# Patient Record
Sex: MALE | Race: WHITE | Employment: OTHER | URBAN - METROPOLITAN AREA
[De-identification: names, ages, dates, MRNs, and addresses within clinical notes are randomized per-mention and may not be internally consistent; named-entity substitution may affect disease eponyms.]

---

## 2017-01-04 ENCOUNTER — HOSPITAL ENCOUNTER (OUTPATIENT)
Dept: LAB | Age: 67
Discharge: HOME | End: 2017-01-04
Payer: MEDICARE

## 2017-01-04 PROCEDURE — 82270 OCCULT BLOOD FECES: CPT

## 2017-01-06 LAB
HEMOCCULT STL QL IA: NEGATIVE
TEST CODE CHANGE, 977287: NORMAL

## 2017-02-25 ENCOUNTER — HOSPITAL ENCOUNTER (EMERGENCY)
Age: 67
Discharge: HOME | End: 2017-02-25
Attending: EMERGENCY MEDICINE
Payer: MEDICARE

## 2017-02-25 VITALS
SYSTOLIC BLOOD PRESSURE: 132 MMHG | DIASTOLIC BLOOD PRESSURE: 68 MMHG | OXYGEN SATURATION: 98 % | WEIGHT: 225 LBS | HEIGHT: 68 IN | TEMPERATURE: 97.8 F | HEART RATE: 68 BPM | RESPIRATION RATE: 16 BRPM | BODY MASS INDEX: 34.1 KG/M2

## 2017-02-25 DIAGNOSIS — S39.012A LUMBAR STRAIN, INITIAL ENCOUNTER: Primary | ICD-10-CM

## 2017-02-25 LAB
APPEARANCE UR: CLEAR
BACTERIA URNS QL MICRO: NEGATIVE /HPF
BILIRUB UR QL: NEGATIVE
COLOR UR: NORMAL
EPITH CASTS URNS QL MICRO: NORMAL /LPF
GLUCOSE UR STRIP.AUTO-MCNC: NEGATIVE MG/DL
HGB UR QL STRIP: NEGATIVE
HYALINE CASTS URNS QL MICRO: NORMAL /LPF (ref 0–5)
KETONES UR QL STRIP.AUTO: NEGATIVE MG/DL
LEUKOCYTE ESTERASE UR QL STRIP.AUTO: NEGATIVE
NITRITE UR QL STRIP.AUTO: NEGATIVE
PH UR STRIP: 6.5 [PH] (ref 5–8)
PROT UR STRIP-MCNC: NEGATIVE MG/DL
RBC #/AREA URNS HPF: NORMAL /HPF (ref 0–5)
SP GR UR REFRACTOMETRY: 1.02 (ref 1–1.03)
UA: UC IF INDICATED,UAUC: NORMAL
UROBILINOGEN UR QL STRIP.AUTO: 0.2 EU/DL (ref 0.2–1)
WBC URNS QL MICRO: NORMAL /HPF (ref 0–4)

## 2017-02-25 PROCEDURE — 81001 URINALYSIS AUTO W/SCOPE: CPT | Performed by: EMERGENCY MEDICINE

## 2017-02-25 PROCEDURE — 74011250636 HC RX REV CODE- 250/636: Performed by: EMERGENCY MEDICINE

## 2017-02-25 PROCEDURE — 96375 TX/PRO/DX INJ NEW DRUG ADDON: CPT

## 2017-02-25 PROCEDURE — 99284 EMERGENCY DEPT VISIT MOD MDM: CPT

## 2017-02-25 PROCEDURE — 96374 THER/PROPH/DIAG INJ IV PUSH: CPT

## 2017-02-25 PROCEDURE — 96361 HYDRATE IV INFUSION ADD-ON: CPT

## 2017-02-25 RX ORDER — FENTANYL CITRATE 50 UG/ML
50 INJECTION, SOLUTION INTRAMUSCULAR; INTRAVENOUS
Status: COMPLETED | OUTPATIENT
Start: 2017-02-25 | End: 2017-02-25

## 2017-02-25 RX ORDER — DIAZEPAM 10 MG/2ML
2 INJECTION INTRAMUSCULAR
Status: COMPLETED | OUTPATIENT
Start: 2017-02-25 | End: 2017-02-25

## 2017-02-25 RX ORDER — METHOCARBAMOL 750 MG/1
750 TABLET, FILM COATED ORAL EVERY 6 HOURS
Qty: 20 TAB | Refills: 0 | Status: SHIPPED | OUTPATIENT
Start: 2017-02-25 | End: 2017-03-02

## 2017-02-25 RX ORDER — METHOCARBAMOL 750 MG/1
750 TABLET, FILM COATED ORAL EVERY 6 HOURS
Qty: 20 TAB | Refills: 0 | Status: SHIPPED | OUTPATIENT
Start: 2017-02-25 | End: 2017-02-25

## 2017-02-25 RX ORDER — TRAMADOL HYDROCHLORIDE 50 MG/1
50 TABLET ORAL
Qty: 20 TAB | Refills: 0 | Status: SHIPPED | OUTPATIENT
Start: 2017-02-25 | End: 2017-05-26 | Stop reason: ALTCHOICE

## 2017-02-25 RX ORDER — PREDNISONE 10 MG/1
TABLET ORAL
Qty: 21 TAB | Refills: 0 | Status: SHIPPED | OUTPATIENT
Start: 2017-02-25 | End: 2017-05-26 | Stop reason: ALTCHOICE

## 2017-02-25 RX ORDER — KETOROLAC TROMETHAMINE 30 MG/ML
30 INJECTION, SOLUTION INTRAMUSCULAR; INTRAVENOUS
Status: COMPLETED | OUTPATIENT
Start: 2017-02-25 | End: 2017-02-25

## 2017-02-25 RX ADMIN — SODIUM CHLORIDE 500 ML: 900 INJECTION, SOLUTION INTRAVENOUS at 09:41

## 2017-02-25 RX ADMIN — METHYLPREDNISOLONE SODIUM SUCCINATE 125 MG: 125 INJECTION, POWDER, FOR SOLUTION INTRAMUSCULAR; INTRAVENOUS at 09:54

## 2017-02-25 RX ADMIN — DIAZEPAM 2 MG: 5 INJECTION, SOLUTION INTRAMUSCULAR; INTRAVENOUS at 09:53

## 2017-02-25 RX ADMIN — SODIUM CHLORIDE 1000 ML: 900 INJECTION, SOLUTION INTRAVENOUS at 10:55

## 2017-02-25 RX ADMIN — KETOROLAC TROMETHAMINE 30 MG: 30 INJECTION, SOLUTION INTRAMUSCULAR at 09:53

## 2017-02-25 RX ADMIN — FENTANYL CITRATE 50 MCG: 50 INJECTION, SOLUTION INTRAMUSCULAR; INTRAVENOUS at 10:47

## 2017-02-25 NOTE — Clinical Note
Take Ibuprofen 4-200mg tablets every 6 hours with food. Use stool softener with laxative when taking narcotic medication

## 2017-02-25 NOTE — DISCHARGE INSTRUCTIONS
We hope that we have addressed all of your medical concerns. The examination and treatment you received in the Emergency Department were for an emergent problem and were not intended as complete care. It is important that you follow up with your healthcare provider(s) for ongoing care. If your symptoms worsen or do not improve as expected, and you are unable to reach your usual health care provider(s), you should return to the Emergency Department. Today's healthcare is undergoing tremendous change, and patient satisfaction surveys are one of the many tools to assess the quality of medical care. You may receive a survey from the [employer] regarding your experience in the Emergency Department. I hope that your experience has been completely positive, particularly the medical care that I provided. As such, please participate in the survey; anything less than excellent does not meet my expectations or intentions. [facility] and [address] [place] [address] participate in nationally recognized quality of care measures. If your blood pressure is greater than 120/80, as reported below, we urge that you seek medical care to address the potential of high blood pressure, commonly known as hypertension. Hypertension can be hereditary or can be caused by certain medical conditions, pain, stress, or \"white coat syndrome. \"       Please make an appointment with your health care provider(s) for follow up of your Emergency Department visit. VITALS:   Patient Vitals for the past 8 hrs:   Temp Pulse Resp BP SpO2   02/25/17 0937 - 78 18 (!) 142/98 98 %   02/25/17 0847 97.6 °F (36.4 °C) 72 20 (!) 141/91 96 %          Thank you for allowing us to provide you with medical care today. We realize that you have many choices for your emergency care needs. Please choose us in the future for any continued health care needs.       Donnie Sinha M.D. [address], [address] [facility]: [phone]            No results found for this or any previous visit (from the past 24 hour(s)). No results found. Back Pain, Emergency or Urgent Symptoms: Care Instructions  Your Care Instructions  Many people have back pain at one time or another. In most cases, pain gets better with self-care that includes over-the-counter pain medicine, ice, heat, and exercises. Unless you have symptoms of a severe injury or heart attack, you may be able to give yourself a few days before you call a doctor. But some back problems are very serious. Do not ignore symptoms that need to be checked right away. Follow-up care is a key part of your treatment and safety. Be sure to make and go to all appointments, and call your doctor if you are having problems. It's also a good idea to know your test results and keep a list of the medicines you take. How can you care for yourself at home? · Sit or lie in positions that are most comfortable and that reduce your pain. Try one of these positions when you lie down:  ¨ Lie on your back with your knees bent and supported by large pillows. ¨ Lie on the floor with your legs on the seat of a sofa or chair. Virginie Settle on your side with your knees and hips bent and a pillow between your legs. ¨ Lie on your stomach if it does not make pain worse. · Do not sit up in bed, and avoid soft couches and twisted positions. Bed rest can help relieve pain at first, but it delays healing. Avoid bed rest after the first day. · Change positions every 30 minutes. If you must sit for long periods of time, take breaks from sitting. Get up and walk around, or lie flat. · Try using a heating pad on a low or medium setting, for 15 to 20 minutes every 2 or 3 hours. Try a warm shower in place of one session with the heating pad. You can also buy single-use heat wraps that last up to 8 hours.  You can also try ice or cold packs on your back for 10 to 20 minutes at a time, several times a day. (Put a thin cloth between the ice pack and your skin.) This reduces pain and makes it easier to be active and exercise. · Take pain medicines exactly as directed. ¨ If the doctor gave you a prescription medicine for pain, take it as prescribed. ¨ If you are not taking a prescription pain medicine, ask your doctor if you can take an over-the-counter medicine. When should you call for help? Call 911 anytime you think you may need emergency care. For example, call if:  · You are unable to move a leg at all. · You have back pain with severe belly pain. · You have symptoms of a heart attack. These may include:  ¨ Chest pain or pressure, or a strange feeling in the chest.  ¨ Sweating. ¨ Shortness of breath. ¨ Nausea or vomiting. ¨ Pain, pressure, or a strange feeling in the back, neck, jaw, or upper belly or in one or both shoulders or arms. ¨ Lightheadedness or sudden weakness. ¨ A fast or irregular heartbeat. After you call 911, the  may tell you to chew 1 adult-strength or 2 to 4 low-dose aspirin. Wait for an ambulance. Do not try to drive yourself. Call your doctor now or seek immediate medical care if:  · You have new or worse symptoms in your arms, legs, chest, belly, or buttocks. Symptoms may include:  ¨ Numbness or tingling. ¨ Weakness. ¨ Pain. · You lose bladder or bowel control. · You have back pain and:  ¨ You have injured your back while lifting or doing some other activity. Call if the pain is severe, has not gone away after 1 or 2 days, and you cannot do your normal daily activities. ¨ You have had a back injury before that needed treatment. ¨ Your pain has lasted longer than 4 weeks. ¨ You have had weight loss you cannot explain. ¨ You are age 48 or older. ¨ You have cancer now or have had it before.   Watch closely for changes in your health, and be sure to contact your doctor if you are not getting better as expected. Where can you learn more? Go to http://fransisco-freddy.info/. Enter Y160 in the search box to learn more about \"Back Pain, Emergency or Urgent Symptoms: Care Instructions. \"  Current as of: May 27, 2016  Content Version: 11.1  © 9803-6376 [employer]. Care instructions adapted under license by [employer] (which disclaims liability or warranty for this information). If you have questions about a medical condition or this instruction, always ask your healthcare professional. [address] any warranty or liability for your use of this information. Back Strain: Care Instructions  Your Care Instructions    Back strain happens when you overstretch, or pull, a muscle in your back. You may hurt your back in an accident or when you exercise or lift something. Most back pain will get better with rest and time. You can take care of yourself at home to help your back heal.  Follow-up care is a key part of your treatment and safety. Be sure to make and go to all appointments, and call your doctor if you are having problems. It's also a good idea to know your test results and keep a list of the medicines you take. How can you care for yourself at home? · Try to stay as active as you can, but stop or reduce any activity that causes pain. · Put ice or a cold pack on the sore muscle for 10 to 20 minutes at a time to stop swelling. Try this every 1 to 2 hours for 3 days (when you are awake) or until the swelling goes down. Put a thin cloth between the ice pack and your skin. · After 2 or 3 days, apply a heating pad on low or a warm cloth to your back. Some doctors suggest that you go back and forth between hot and cold treatments. · Take pain medicines exactly as directed. ¨ If the doctor gave you a prescription medicine for pain, take it as prescribed.   ¨ If you are not taking a prescription pain medicine, ask your doctor if you can take an over-the-counter medicine. · Try sleeping on your side with a pillow between your legs. Or put a pillow under your knees when you lie on your back. These measures can ease pain in your lower back. · Return to your usual level of activity slowly. When should you call for help? Call 911 anytime you think you may need emergency care. For example, call if:  · You are unable to move a leg at all. Call your doctor now or seek immediate medical care if:  · You have new or worse symptoms in your legs, belly, or buttocks. Symptoms may include:  ¨ Numbness or tingling. ¨ Weakness. ¨ Pain. · You lose bladder or bowel control. Watch closely for changes in your health, and be sure to contact your doctor if you are not getting better as expected. Where can you learn more? Go to http://fransisco-freddy.info/. Enter R784 in the search box to learn more about \"Back Strain: Care Instructions. \"  Current as of: May 23, 2016  Content Version: 11.1  © 5968-3495 [employer]. Care instructions adapted under license by [employer] (which disclaims liability or warranty for this information). If you have questions about a medical condition or this instruction, always ask your healthcare professional. [address] any warranty or liability for your use of this information.

## 2017-02-25 NOTE — ED TRIAGE NOTES
Triage Note: Patient is coming in via EMS with complaints of back pain that started this morning. Denies injury.

## 2017-02-25 NOTE — ED PROVIDER NOTES
HPI Comments: 73 y.o. male with past medical history significant for basal cell carcinoma and hyperlipidemia who presents accompanied by his wife with chief complaint of back pain. The pt c/o L \"lumbar pain\" that has been ongoing for about a week and significantly worsened this morning. The pt explains that he first developed back pain when he tried to  trash off the ground a week ago. The pt says that he has been bedridden for the past 5 days and was able to ambulate well yesterday. The pt reports that he reached over to turn on the faucet this morning and developed sharp, left-sided low back pain. The pt says is pain is \"15 out of 10.\" The pt notes pain with deep breaths. The pt says that he does not like taking NSAIDs since Advil causes circulatory problems and ASA induces constipation. Denies numbness/tingling, weakness, radicular pain down his legs, and bowel and bladder incontinence. There are no other acute medical concerns at this time. Social hx: Former smoker. Marijuana use. PCP: Erika Figueredo DO    Note written by Leona Shoemaker, as dictated by Joaquín Strong MD 9:06 AM      The history is provided by the patient. No  was used. Past Medical History:   Diagnosis Date    Basal cell carcinoma     Hyperlipidemia LDL goal < 130 3/6/2015       Past Surgical History:   Procedure Laterality Date    HX HEENT      basal cell carcinoma         Family History:   Problem Relation Age of Onset    No Known Problems Mother     No Known Problems Father        Social History     Social History    Marital status:      Spouse name: N/A    Number of children: N/A    Years of education: N/A     Occupational History    Not on file.      Social History Main Topics    Smoking status: Former Smoker     Packs/day: 0.25    Smokeless tobacco: Never Used    Alcohol use No    Drug use: Yes     Special: Marijuana    Sexual activity: Yes     Partners: Female     Birth control/ protection: None     Other Topics Concern    Not on file     Social History Narrative         ALLERGIES: Lactose; Lumigan [bimatoprost]; Other medication; Sunscreen; and Xalatan [latanoprost]    Review of Systems   Constitutional: Negative for chills, diaphoresis and fever. HENT: Negative for congestion, postnasal drip, rhinorrhea and sore throat. Eyes: Negative for photophobia, discharge, redness and visual disturbance. Respiratory: Negative for cough, chest tightness, shortness of breath and wheezing. Cardiovascular: Negative for chest pain, palpitations and leg swelling. Gastrointestinal: Negative for abdominal distention, abdominal pain, blood in stool, constipation, diarrhea, nausea and vomiting. Genitourinary: Negative for difficulty urinating, dysuria, frequency, hematuria and urgency. Musculoskeletal: Positive for back pain (lower left). Negative for arthralgias, joint swelling and myalgias. Skin: Negative for color change and rash. Neurological: Negative for dizziness, speech difficulty, weakness, light-headedness, numbness and headaches. Psychiatric/Behavioral: Negative for confusion. The patient is not nervous/anxious. All other systems reviewed and are negative. Vitals:    02/25/17 0847   BP: (!) 141/91   Pulse: 72   Resp: 20   Temp: 97.6 °F (36.4 °C)   SpO2: 96%   Weight: 102.1 kg (225 lb)   Height: 5' 8\" (1.727 m)            Physical Exam   Constitutional: He is oriented to person, place, and time. He appears well-developed and well-nourished. No distress. HENT:   Head: Normocephalic and atraumatic. Right Ear: External ear normal.   Left Ear: External ear normal.   Nose: Nose normal.   Mouth/Throat: Oropharynx is clear and moist.   Eyes: Conjunctivae and EOM are normal. Pupils are equal, round, and reactive to light. No scleral icterus. Neck: Normal range of motion. Neck supple. No JVD present. No tracheal deviation present. No thyromegaly present. Cardiovascular: Normal rate, regular rhythm and normal heart sounds. Exam reveals no gallop and no friction rub. No murmur heard. Pulmonary/Chest: Effort normal and breath sounds normal. No respiratory distress. He has no wheezes. He has no rales. He exhibits no tenderness. Abdominal: Soft. Bowel sounds are normal. He exhibits no distension and no mass. There is no tenderness. There is no rebound and no guarding. Musculoskeletal: Normal range of motion. He exhibits no edema. Lumbar back: He exhibits tenderness (left paraspinal). He exhibits no bony tenderness (at the midline). Lymphadenopathy:     He has no cervical adenopathy. Neurological: He is alert and oriented to person, place, and time. He has normal strength. He displays no atrophy and no tremor. No cranial nerve deficit. He exhibits normal muscle tone. Coordination and gait normal.   NVID. Negative straight leg test bilaterally. Skin: Skin is warm and dry. No rash noted. He is not diaphoretic. No erythema. Psychiatric: He has a normal mood and affect. His behavior is normal. Judgment and thought content normal.   Nursing note and vitals reviewed. Note written by Leona Vang, as dictated by Reinier Rincon MD 9:06 AM    MDM  Number of Diagnoses or Management Options  Diagnosis management comments: Patient presented to the emergency department with acute onset of left paralumbar pain occurred 5 days prior when picking up trash from the ground. No distal numbness weakness or paresthesias, no difficulty voiding. The patient has not used any analgesic or anti-inflammatory medications at home. His examination is most consistent with paralumbar muscle strain, there is nothing to indicate that this is a lumbar vertebral or disc disease, nor is his consistent with urinary tract infection or nephrolithiasis.     Plan of care will be analgesic anti-inflammatory and antispasmodic medication in the emergency department and will continue to treat him accordingly. ED Course       Procedures      PROGRESS NOTE:  11:51 AM Pt improved about 50%. No pain unless he moves.

## 2017-02-28 ENCOUNTER — PATIENT OUTREACH (OUTPATIENT)
Dept: FAMILY MEDICINE CLINIC | Age: 67
End: 2017-02-28

## 2017-02-28 NOTE — PROGRESS NOTES
Patient listed on [facility] discharge list on 17 for [facility] ED admit for Lumbar strain. Patient called today and was reached by NN. Patient verified by , name and address. Patient states, \"I am doing much better. They gave me some pain medication in the Ed and I am taking the Prednisone and pain medication as ordered. I am getting my medication at [facility] instead of [facility] because of my insurance. \"  Patient presented to Ed with back pain that started that morning. Patient treated in Ed with anti-inflammatory agents and Prednisone. Patient discharged home with instructions. See discharge instructions. Patient declined a follow up at this time. Patient given NN contact information for questions and concerns.

## 2017-05-04 DIAGNOSIS — E78.5 HYPERLIPIDEMIA WITH TARGET LDL LESS THAN 130: ICD-10-CM

## 2017-05-04 NOTE — TELEPHONE ENCOUNTER
Contact # is [phone]    Patient is requesting a refill on medication:  Requested Prescriptions     Pending Prescriptions Disp Refills    simvastatin (ZOCOR) 20 mg tablet 90 Tab 1     Took last pill last night. Scheduled to come in on May 26th; requesting [facility] prescription.  Pharmacy updated

## 2017-05-05 RX ORDER — SIMVASTATIN 20 MG/1
TABLET, FILM COATED ORAL
Qty: 30 TAB | Refills: 0 | Status: SHIPPED | OUTPATIENT
Start: 2017-05-05 | End: 2017-05-26 | Stop reason: SDUPTHER

## 2017-05-26 ENCOUNTER — HOSPITAL ENCOUNTER (OUTPATIENT)
Dept: LAB | Age: 67
Discharge: HOME | End: 2017-05-26
Payer: MEDICARE

## 2017-05-26 ENCOUNTER — TELEPHONE (OUTPATIENT)
Dept: FAMILY MEDICINE CLINIC | Age: 67
End: 2017-05-26

## 2017-05-26 ENCOUNTER — OFFICE VISIT (OUTPATIENT)
Dept: FAMILY MEDICINE CLINIC | Age: 67
End: 2017-05-26

## 2017-05-26 VITALS
HEIGHT: 68 IN | SYSTOLIC BLOOD PRESSURE: 132 MMHG | OXYGEN SATURATION: 98 % | WEIGHT: 224.2 LBS | RESPIRATION RATE: 18 BRPM | HEART RATE: 75 BPM | BODY MASS INDEX: 33.98 KG/M2 | TEMPERATURE: 98 F | DIASTOLIC BLOOD PRESSURE: 82 MMHG

## 2017-05-26 DIAGNOSIS — M19.039 ARTHRITIS OF WRIST: ICD-10-CM

## 2017-05-26 DIAGNOSIS — R73.02 GLUCOSE INTOLERANCE (IMPAIRED GLUCOSE TOLERANCE): ICD-10-CM

## 2017-05-26 DIAGNOSIS — Z13.6 ENCOUNTER FOR ABDOMINAL AORTIC ANEURYSM (AAA) SCREENING: ICD-10-CM

## 2017-05-26 DIAGNOSIS — E78.5 HYPERLIPIDEMIA WITH TARGET LDL LESS THAN 130: ICD-10-CM

## 2017-05-26 DIAGNOSIS — N40.1 BENIGN PROSTATIC HYPERPLASIA WITH LOWER URINARY TRACT SYMPTOMS, UNSPECIFIED MORPHOLOGY: ICD-10-CM

## 2017-05-26 DIAGNOSIS — H40.10X0 OPEN-ANGLE GLAUCOMA OF BOTH EYES, UNSPECIFIED GLAUCOMA STAGE, UNSPECIFIED OPEN-ANGLE GLAUCOMA TYPE: ICD-10-CM

## 2017-05-26 DIAGNOSIS — Z00.00 WELCOME TO MEDICARE PREVENTIVE VISIT: Primary | ICD-10-CM

## 2017-05-26 PROCEDURE — 80061 LIPID PANEL: CPT

## 2017-05-26 PROCEDURE — 83036 HEMOGLOBIN GLYCOSYLATED A1C: CPT

## 2017-05-26 PROCEDURE — 80053 COMPREHEN METABOLIC PANEL: CPT

## 2017-05-26 NOTE — PROGRESS NOTES
Nico [place] [address] [facility]  [address] [place], [address]  [phone]             Date of visit: 5/26/17     This is an Initial Medicare Preventive Physical Exam (IPPE), \"Welcome to Medicare\" (Performed within 12 months of effective date of Medicare Part B enrollment, Once in a lifetime, not to be done if patient already had a Medicare Annual Wellness Visit)    I have reviewed the patient's medical history in detail and updated the computerized patient record. Rey Edouard is a 73 y.o. male   History obtained from: the patient. Concerns today     -new patient to me, needs refills meds. Overall feeling well, this is a better season for him.  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 He does wear hat, long sleeves when out fishing. Can't even tolerate the zinc oxide. He is sure it is the sunblock. Will get blistery  -does have glaucoma but had problems with all the drops. Terrible headaches.    -history of inabilitiy to finish urinating, slowness at the end. The saw palmetto is working great. Dr. Robert Morin checked his prostate and said did not feel large.   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     Past Medical History:   Diagnosis Date    Arthritis of wrist 5/26/2017    Basal cell carcinoma     Hyperlipidemia LDL goal < 130 3/6/2015      Past Surgical History:   Procedure Laterality Date    HX HEENT      basal cell carcinoma     Allergies   Allergen Reactions    Lactose Nausea and Vomiting     GI upset     Lumigan [Bimatoprost] Hives     Eye lesions     Other Medication Hives     Sunscreen     Sunscreen Hives    Xalatan [Latanoprost] Hives     Current Outpatient Prescriptions   Medication Sig Dispense Refill    simvastatin (ZOCOR) 20 mg tablet TAKE ONE TABLET BY MOUTH IN THE EVENING 30 Tab 0    glucosamine (GLUCOSAMINE RELIEF) 1,000 mg tab Take  by mouth.  MV,MINERALS/FA/LYCOPENE/GINKGO (ONE-A-DAY MEN'S 50+ ADVANTAGE PO) Take  by mouth.  Cholecalciferol, Vitamin D3, (VITAMIN D3) 1,000 unit cap Take  by mouth.  VITAMIN B COMPLEX & VIT C NO.3 (VITAMIN B COMP AND C NO.3 PO) Take 1 tablet by mouth daily.  OMEGA-3 FATTY ACIDS (FISH OIL CONCENTRATE PO) Take 2,000 mg by mouth daily.  GINKGO BILOBA (GINKOBA PO) Take 120 mg by mouth daily. Family History   Problem Relation Age of Onset    No Known Problems Mother     No Known Problems Father      Social History   Substance Use Topics    Smoking status: Former Smoker     Packs/day: 0.25     Types: Cigarettes    Smokeless tobacco: Never Used      Comment: hardly smoked    Alcohol use No       Specialists/Care Team   Yemi Fields has established care with the following healthcare providers:  Patient Care Team:  Selin Siu MD as PCP - General (Family Practice)  Has seen eye doctor, long time since he saw dermatologist    Depression Risk Factor Screening:   -Over the past 2 weeks, have you felt down, depressed or hopeless? no  -Over the past 2 weeks, have you felt little interest or pleasure in doing things? no    Lifestyle and Health Habits:   Diet: several servings of veggies, fish. Doesn't eat a lot because he metabolizes slowly. Gains weight if he eats 3 meals per day. Physical activity: generally active with fish    Functional Ability and Level of Safety:     Hearing Loss   Have you noticed any hearing difficulties?  no    Activities of Daily Living   Do you need help with the phone, transportation, shopping, preparing meals, housework, laundry, medications or managing money? no  Requires assistance with: no ADLs    Fall Risk and Home Safety   Was the patient's timed Up & Go test unsteady or longer than 30 seconds? no  Does your home have rugs in the hallway, lack grab bars in the bathroom, lack handrails on the stairs or have poor lighting? 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 yes    Abuse Screen   Patient is not abused    Evaluation of Cognitive Function   Mood/affect:  happy  Orientation: normal  Appearance: age appropriates  Family member/caregiver input: none    Review of Systems (if indicated for problems addressed today)    admits to history of urinary difficulties, saw palmetto helps. Gets up once at night at 4am  GI: denies hematochezia  Card: denies chest pain  Pulm: denies shortness of breath       Physical Examination     Vitals:    05/26/17 1131   BP: 132/82   Pulse: 75   Resp: 18   Temp: 98 °F (36.7 °C)   TempSrc: Oral   SpO2: 98%   Weight: 224 lb 3.2 oz (101.7 kg)   Height: 5' 8\" (1.727 m)     Body mass index is 34.09 kg/(m^2). Additional exam if indicated for problems addressed today:  General: stated age, obese and in NAD  Neck: supple, symmetrical, trachea midline, no adenopathy and thyroid: not enlarged, symmetric, no tenderness/mass/nodules  Lungs:  clear to auscultation w/o rales, rhonchi, wheezes w/normal effort and no use of accessory muscles of respiration   Heart: regular rate and rhythm, S1, S2 normal, no murmur, click, rub or gallop  Abdomen: soft, nontender, no masses, BS normal  Ext:  No edema noted.    Lymph: no cervical adenopathy appreciated  Skin:  Normal. and no rash or abnormalities   Psych: alert and oriented to person, place, time and situation and Speech: appropriate quality, quantity and organization of sentences   MSK: wrists without swelling or deformity, mildly tender and pain with movements    Screening ECG Result (optional)   Not done, have one on file already    Advice/Referrals/Counseling (as indicated)   Education and counseling provided for any problems identified above: diet, exercise (especially needs more regular exercise in winter, has rowing machine so I encouraged that daily)  Counseled about tobacco cessation: n/a  Counseled about alcohol misuse: n/a  Counseled about prevention of sexually transmitted infections: n/a    Preventive Services     (Checklist to be included in patient instructions)  Discussed today Done Previously Not Needed    X rx sent for -13   Pneumococcal vaccines      Flu vaccine     x Hepatitis B vaccine (if at risk)   X rx sent   Shingles vaccine    x  TDAP vaccine    x  Digital rectal exam    x  PSA   Declines c-scope, does FIT x  Colorectal cancer screening     x Low-dose CT for lung cancer screening     x Bone density test   x x  Glaucoma screening    x  Cholesterol test    x  Diabetes screening test    x   AAA ultrasound (if previous smoker age 73-68 or if family history)     x Diabetes self-management class     x Nutritionist referral for diabetes or renal disease     Discussion of Advance Directive   Discussed with Wagner Carrion his ability to prepare and advance directive in the case that an injury or illness causes him to be unable to make health care decisions. Discussed that I would be willing to follow his wishes as expressed in the advance directive. He has one, will bring in a copy. Assessment/Plan       ICD-10-CM ICD-9-CM    1. Welcome to Medicare preventive visit Z00.00 V70.0    2. Open-angle glaucoma of both eyes, unspecified glaucoma stage, unspecified open-angle glaucoma type H40.10X0 365.10      365.70     reactions to several eye drops, encoruaged him to follow up with eye doctor anyway, discussed potential for vision loss if not treated   3.  Hyperlipidemia with target LDL less than 130 R30.7 788.8 METABOLIC PANEL, COMPREHENSIVE      LIPID PANEL    tolerating zocor, will check labs and then refill, lifestyle encouraged   4. Glucose intolerance (impaired glucose tolerance) R73.02 790.22 HEMOGLOBIN A1C WITH EAG    weight loss, regular exercise, avoiding excessive carbs encouraged, recheck lab today   5. Benign prostatic hyperplasia with lower urinary tract symptoms, unspecified morphology N40.1 600.01     no symptoms on saw palmetto, had normal PSA last year   6. 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 716.93     chronic pain in wrists but doesn't seem inflammatory, told him prn tylenol ok, he thinks marijuana helps all his problems more than anything   7. Encounter for abdominal aortic aneurysm (AAA) screening Z13.6 V81.2 US EXAM SCREENING AAA       Orders Placed This Encounter    US EXAM SCREENING AAA    METABOLIC PANEL, COMPREHENSIVE    LIPID PANEL    HEMOGLOBIN A1C WITH EAG    CVD REPORT    varicella zoster vacine live (VARICELLA-ZOSTER VACINE LIVE) 19,400 unit/0.65 mL susr injection    pneumococcal 13 rome conj dip (PREVNAR-13) 0.5 mL syrg injection       Follow-up Disposition:  Return in about 6 months (around 11/26/2017).     Linsey Cervantes MD

## 2017-05-26 NOTE — MR AVS SNAPSHOT
Visit Information Date & Time Provider Department Dept. Phone Encounter #  
 5/26/2017 11:30 AM Colby Boast, [address] [phone] [id number] Follow-up Instructions Return in about 6 months (around 11/26/2017). Upcoming Health Maintenance Date Due  
 GLAUCOMA SCREENING Q2Y 11/14/2016 Pneumococcal 65+ Low/Medium Risk (1 of 2 - PCV13) 11/14/2016 MEDICARE YEARLY EXAM 11/14/2016 INFLUENZA AGE 9 TO ADULT 8/1/2017 FOBT Q 1 YEAR AGE 50-75 1/4/2018 DTaP/Tdap/Td series (2 - Td) 6/24/2025 Allergies as of 5/26/2017  Review Complete On: 5/26/2017 By: Colby Boast, MD  
  
 Severity Noted Reaction Type Reactions Lactose  02/27/2015    Nausea and Vomiting GI upset Lumigan [Bimatoprost]  04/28/2016    Hives Eye lesions Other Medication  02/27/2015    Hives Sunscreen Sunscreen  02/27/2015    Hives Xalatan [Latanoprost]  10/20/2015    Hives Current Immunizations  Reviewed on 1/12/2015 Name Date Influenza High Dose Vaccine PF 12/9/2016 Influenza Vaccine 10/15/2016 Influenza Vaccine (Quad) PF 10/20/2015 Td 1/12/2008 Tdap 6/24/2015 Not reviewed this visit You Were Diagnosed With   
  
 Codes Comments Welcome to Medicare preventive visit    -  Primary ICD-10-CM: Z00.00 ICD-9-CM: V70.0 Open-angle glaucoma of both eyes, unspecified glaucoma stage, unspecified open-angle glaucoma type     ICD-10-CM: H40.10X0 ICD-9-CM: 365.10, 365.70 Hyperlipidemia with target LDL less than 130     ICD-10-CM: E78.5 ICD-9-CM: 272.4 Glucose intolerance (impaired glucose tolerance)     ICD-10-CM: R73.02 
ICD-9-CM: 790.22 Benign prostatic hyperplasia with lower urinary tract symptoms, unspecified morphology     ICD-10-CM: N40.1 ICD-9-CM: 600.01 Encounter for abdominal aortic aneurysm (AAA) screening     ICD-10-CM: Z13.6 ICD-9-CM: V81.2  Arthritis of wrist     ICD-10-CM: M19.039 
 ICD-9-CM: 716.93 Vitals BP Pulse Temp Resp Height(growth percentile) Weight(growth percentile) 132/82 (BP 1 Location: Right arm, BP Patient Position: Sitting) 75 98 °F (36.7 °C) (Oral) 18 5' 8\" (1.727 m) 224 lb 3.2 oz (101.7 kg) SpO2 BMI Smoking Status 98% 34.09 kg/m2 Former Smoker Vitals History BMI and BSA Data Body Mass Index Body Surface Area 34.09 kg/m 2 2.21 m 2 Preferred Pharmacy Pharmacy Name Phone [facility] [address] [facility] [phone] Your Updated Medication List  
  
   
This list is accurate as of: 17 12:16 PM.  Always use your most recent med list.  
  
  
  
  
 FISH OIL CONCENTRATE PO Take 2,000 mg by mouth daily. GINKOBA PO Take 120 mg by mouth daily. GLUCOSAMINE RELIEF 1,000 mg Tab Generic drug:  glucosamine Take  by mouth. ONE-A-DAY MEN'S 50+ ADVANTAGE PO Take  by mouth.  
  
 pneumococcal 13 rome conj dip 0.5 mL Syrg injection Commonly known as:  PREVNAR-13  
0.5 mL by IntraMUSCular route once for 1 dose. simvastatin 20 mg tablet Commonly known as:  ZOCOR  
TAKE ONE TABLET BY MOUTH IN THE EVENING  
  
 varicella zoster vacine live 19,400 unit/0.65 mL Susr injection Commonly known as:  varicella-zoster vacine live 1 Vial by SubCUTAneous route once for 1 dose. VITAMIN B COMP AND C NO.3 PO Take 1 tablet by mouth daily. VITAMIN D3 1,000 unit Cap Generic drug:  cholecalciferol Take  by mouth. Prescriptions Sent to Pharmacy Refills  
 varicella zoster vacine live (VARICELLA-ZOSTER VACINE LIVE) 19,400 unit/0.65 mL susr injection 0 Si Vial by SubCUTAneous route once for 1 dose. Class: Normal  
 Pharmacy: [facility] [facility] Ph #: [phone]  Route: SubCUTAneous  
 pneumococcal 13 rome conj dip (PREVNAR-13) 0.5 mL syrg injection 0  
 Si.5 mL by IntraMUSCular route once for 1 dose. Class: Normal  
 Pharmacy: [address], [address] [facility] [place] Ph #: [phone] Route: IntraMUSCular We Performed the Following HEMOGLOBIN A1C WITH EAG [47670 CPT(R)] LIPID PANEL [47673 CPT(R)] METABOLIC PANEL, COMPREHENSIVE [33224 CPT(R)] Follow-up Instructions Return in about 6 months (around 2017). To-Do List   
 2017 Imaging:  US EXAM SCREENING AAA Patient Instructions   
(Checklist to be included in patient instructions) Discussed today Done Previously Not Needed X rx sent for -13   Pneumococcal vaccines Flu vaccine  
  x Hepatitis B vaccine (if at risk) X rx sent   Shingles vaccine  
 x  TDAP vaccine  
 x  Digital rectal exam  
 x  PSA Declines c-scope, does FIT x  Colorectal cancer screening  
  x Low-dose CT for lung cancer screening  
  x Bone density test  
x x  Glaucoma screening  
 x  Cholesterol test  
 x  Diabetes screening test   
x   AAA ultrasound (if previous smoker age 73-68 or if family history) x Diabetes self-management class  
  x Nutritionist referral for diabetes or renal disease Please bring in a copy of your advanced directive for us to put in your chart Try to get shots at [facility] (pneumonia 13 and shingles zostavax) It is VERY important that you get daily exercise--please get in the habit of using the rowing machine at least 10 minutes daily Well Visit, Over 72: Care Instructions Your Care Instructions Physical exams can help you stay healthy. Your doctor has checked your overall health and may have suggested ways to take good care of yourself. He or she also may have recommended tests. At home, you can help prevent illness with healthy eating, regular exercise, and other steps. Follow-up care is a key part of your treatment and safety.  Be sure to make and go to all appointments, and call your doctor if you are having problems. It's also a good idea to know your test results and keep a list of the medicines you take. How can you care for yourself at home? · Reach and stay at a healthy weight. This will lower your risk for many problems, such as obesity, diabetes, heart disease, and high blood pressure. · Get at least 30 minutes of exercise on most days of the week. Walking is a good choice. You also may want to do other activities, such as running, swimming, cycling, or playing tennis or team sports. · Do not smoke. Smoking can make health problems worse. If you need help quitting, talk to your doctor about stop-smoking programs and medicines. These can increase your chances of quitting for good. · Protect your skin from too much sun. When you're outdoors from 10 a.m. to 4 p.m., stay in the shade or cover up with clothing and a hat with a wide brim. Wear sunglasses that block UV rays. Even when it's cloudy, put broad-spectrum sunscreen (SPF 30 or higher) on any exposed skin. · See a dentist one or two times a year for checkups and to have your teeth cleaned. · Wear a seat belt in the car. · Limit alcohol to 2 drinks a day for men and 1 drink a day for women. Too much alcohol can cause health problems. Follow your doctor's advice about when to have certain tests. These tests can spot problems early. For men and women · Cholesterol. Your doctor will tell you how often to have this done based on your overall health and other things that can increase your risk for heart attack and stroke. · Blood pressure. Have your blood pressure checked during a routine doctor visit. Your doctor will tell you how often to check your blood pressure based on your age, your blood pressure results, and other factors. · Diabetes. Ask your doctor whether you should have tests for diabetes. · Vision.  Experts recommend that you have yearly exams for glaucoma and other age-related eye problems. · Hearing. Tell your doctor if you notice any change in your hearing. You can have tests to find out how well you hear. · Colon cancer tests. Keep having colon cancer tests as your doctor recommends. You can have one of several types of tests. · Heart attack and stroke risk. At least every 4 to 6 years, you should have your risk for heart attack and stroke assessed. Your doctor uses factors such as your age, blood pressure, cholesterol, and whether you smoke or have diabetes to show what your risk for a heart attack or stroke is over the next 10 years. · Osteoporosis. Talk to your doctor about whether you should have a bone density test to find out whether you have thinning bones. Also ask your doctor about whether you should take calcium and vitamin D supplements. For women · Pap test and pelvic exam. You may no longer need a Pap test. Talk with your doctor about whether to stop or continue to have Pap tests. · Breast exam and mammogram. Ask how often you should have a mammogram, which is an X-ray of your breasts. A mammogram can spot breast cancer before it can be felt and when it is easiest to treat. · Thyroid disease. Talk to your doctor about whether to have your thyroid checked as part of a regular physical exam. Women have an increased chance of a thyroid problem. For men · Prostate exam. Talk to your doctor about whether you should have a blood test (called a PSA test) for prostate cancer. Experts disagree on whether men should have this test. Some experts recommend that you discuss the benefits and risks of the test with your doctor. · Abdominal aortic aneurysm. Ask your doctor whether you should have a test to check for an aneurysm. You may need a test if you ever smoked or if your parent, brother, sister, or child has had an aneurysm. When should you call for help?  
Watch closely for changes in your health, and be sure to contact your doctor if you have any problems or symptoms that concern you. Where can you learn more? Go to http://fransisco-freddy.info/. Enter I621 in the search box to learn more about \"Well Visit, Over 65: Care Instructions. \" Current as of: July 19, 2016 Content Version: 11.2 © 2962-3060 [employer]. Care instructions adapted under license by [employer] (which disclaims liability or warranty for this information). If you have questions about a medical condition or this instruction, always ask your healthcare professional. [address] any warranty or liability for your use of this information. Introducing [facility]! Dear Danielle Evans: Thank you for requesting a Splore account. Our records indicate that you already have an active Splore account. You can access your account anytime at [email or web address] Did you know that you can access your hospital and ER discharge instructions at any time in Splore? You can also review all of your test results from your hospital stay or ER visit. Additional Information If you have questions, please visit the Frequently Asked Questions section of the Splore website at [email or web address] Remember, Splore is NOT to be used for urgent needs. For medical emergencies, dial 911. Now available from your iPhone and Android! Please provide this summary of care documentation to your next provider. Your primary care clinician is listed as Job Mccullough. If you have any questions after today's visit, please call [phone].

## 2017-05-26 NOTE — PATIENT INSTRUCTIONS
(Checklist to be included in patient instructions)  Discussed today Done Previously Not Needed    X rx sent for -13   Pneumococcal vaccines      Flu vaccine     x Hepatitis B vaccine (if at risk)   X rx sent   Shingles vaccine    x  TDAP vaccine    x  Digital rectal exam    x  PSA   Declines c-scope, does FIT x  Colorectal cancer screening     x Low-dose CT for lung cancer screening     x Bone density test   x x  Glaucoma screening    x  Cholesterol test    x  Diabetes screening test    x   AAA ultrasound (if previous smoker age 73-68 or if family history)     x Diabetes self-management class     x Nutritionist referral for diabetes or renal disease     Please bring in a copy of your advanced directive for us to put in your chart    Try to get shots at [facility] (pneumonia 13 and shingles zostavax)    It is VERY important that you get daily exercise--please get in the habit of using the rowing machine at least 10 minutes daily         Well Visit, Over 65: Care Instructions  Your Care Instructions  Physical exams can help you stay healthy. Your doctor has checked your overall health and may have suggested ways to take good care of yourself. He or she also may have recommended tests. At home, you can help prevent illness with healthy eating, regular exercise, and other steps. Follow-up care is a key part of your treatment and safety. Be sure to make and go to all appointments, and call your doctor if you are having problems. It's also a good idea to know your test results and keep a list of the medicines you take. How can you care for yourself at home? · Reach and stay at a healthy weight. This will lower your risk for many problems, such as obesity, diabetes, heart disease, and high blood pressure. · Get at least 30 minutes of exercise on most days of the week. Walking is a good choice. You also may want to do other activities, such as running, swimming, cycling, or playing tennis or team sports. · Do not smoke. Smoking can make health problems worse. If you need help quitting, talk to your doctor about stop-smoking programs and medicines. These can increase your chances of quitting for good. · Protect your skin from too much sun. When you're outdoors from 10 a.m. to 4 p.m., stay in the shade or cover up with clothing and a hat with a wide brim. Wear sunglasses that block UV rays. Even when it's cloudy, put broad-spectrum sunscreen (SPF 30 or higher) on any exposed skin. · See a dentist one or two times a year for checkups and to have your teeth cleaned. · Wear a seat belt in the car. · Limit alcohol to 2 drinks a day for men and 1 drink a day for women. Too much alcohol can cause health problems. Follow your doctor's advice about when to have certain tests. These tests can spot problems early. For men and women  · Cholesterol. Your doctor will tell you how often to have this done based on your overall health and other things that can increase your risk for heart attack and stroke. · Blood pressure. Have your blood pressure checked during a routine doctor visit. Your doctor will tell you how often to check your blood pressure based on your age, your blood pressure results, and other factors. · Diabetes. Ask your doctor whether you should have tests for diabetes. · Vision. Experts recommend that you have yearly exams for glaucoma and other age-related eye problems. · Hearing. Tell your doctor if you notice any change in your hearing. You can have tests to find out how well you hear. · Colon cancer tests. Keep having colon cancer tests as your doctor recommends. You can have one of several types of tests. · Heart attack and stroke risk. At least every 4 to 6 years, you should have your risk for heart attack and stroke assessed.  Your doctor uses factors such as your age, blood pressure, cholesterol, and whether you smoke or have diabetes to show what your risk for a heart attack or stroke is over the next 10 years.  · Osteoporosis. Talk to your doctor about whether you should have a bone density test to find out whether you have thinning bones. Also ask your doctor about whether you should take calcium and vitamin D supplements. For women  · Pap test and pelvic exam. You may no longer need a Pap test. Talk with your doctor about whether to stop or continue to have Pap tests. · Breast exam and mammogram. Ask how often you should have a mammogram, which is an X-ray of your breasts. A mammogram can spot breast cancer before it can be felt and when it is easiest to treat. · Thyroid disease. Talk to your doctor about whether to have your thyroid checked as part of a regular physical exam. Women have an increased chance of a thyroid problem. For men  · Prostate exam. Talk to your doctor about whether you should have a blood test (called a PSA test) for prostate cancer. Experts disagree on whether men should have this test. Some experts recommend that you discuss the benefits and risks of the test with your doctor. · Abdominal aortic aneurysm. Ask your doctor whether you should have a test to check for an aneurysm. You may need a test if you ever smoked or if your parent, brother, sister, or child has had an aneurysm. When should you call for help? Watch closely for changes in your health, and be sure to contact your doctor if you have any problems or symptoms that concern you. Where can you learn more? Go to http://fransisco-freddy.info/. Enter Q246 in the search box to learn more about \"Well Visit, Over 65: Care Instructions. \"  Current as of: July 19, 2016  Content Version: 11.2  © 6696-3683 [employer]. Care instructions adapted under license by [employer] (which disclaims liability or warranty for this information).  If you have questions about a medical condition or this instruction, always ask your healthcare professional. Lexii Andino disclaims any warranty or liability for your use of this information.

## 2017-05-26 NOTE — PROGRESS NOTES
Chief Complaint   Patient presents with    Cholesterol Problem     follow up , to est. care with new provider ( former Dr. Angelique Concepcion ) , fasting today    Blood sugar problem     follow up       Reviewed Record in preparation for visit and have obtained necessary documentation. Identified pt with two pt identifiers (Name @ )    Health Maintenance Due   Topic    GLAUCOMA SCREENING Q2Y     Pneumococcal 65+ Low/Medium Risk (1 of 2 - PCV13)    MEDICARE YEARLY EXAM          1. Have you been to the ER, urgent care clinic since your last visit? Hospitalized since your last visit? Went to [facility]  About 1 month ago for back pain. 2. Have you seen or consulted any other health care providers outside of the [address] [place] [address] since your last visit? Include any pap smears or colon screening. no  Chief Complaint   Patient presents with    Cholesterol Problem     follow up , to est. care with new provider ( former Dr. Angelique Concepcion ) , fasting today    Blood sugar problem     follow up       Reviewed Record in preparation for visit and have obtained necessary documentation. Identified pt with two pt identifiers (Name @ )    Health Maintenance Due   Topic    GLAUCOMA SCREENING Q2Y     Pneumococcal 65+ Low/Medium Risk (1 of 2 - PCV13)    MEDICARE YEARLY EXAM          1. Have you been to the ER, urgent care clinic since your last visit? Hospitalized since your last visit? No    2. Have you seen or consulted any other health care providers outside of the [address] [place] [address] since your last visit? Include any pap smears or colon screening.  No

## 2017-05-27 LAB
ALBUMIN SERPL-MCNC: 4.6 G/DL (ref 3.6–4.8)
ALBUMIN/GLOB SERPL: 1.8 {RATIO} (ref 1.2–2.2)
ALP SERPL-CCNC: 68 IU/L (ref 39–117)
ALT SERPL-CCNC: 27 IU/L (ref 0–44)
AST SERPL-CCNC: 19 IU/L (ref 0–40)
BILIRUB SERPL-MCNC: 0.4 MG/DL (ref 0–1.2)
BUN SERPL-MCNC: 12 MG/DL (ref 8–27)
BUN/CREAT SERPL: 12 (ref 10–24)
CALCIUM SERPL-MCNC: 9.3 MG/DL (ref 8.6–10.2)
CHLORIDE SERPL-SCNC: 103 MMOL/L (ref 96–106)
CHOLEST SERPL-MCNC: 185 MG/DL (ref 100–199)
CO2 SERPL-SCNC: 18 MMOL/L (ref 18–29)
CREAT SERPL-MCNC: 1.03 MG/DL (ref 0.76–1.27)
EST. AVERAGE GLUCOSE BLD GHB EST-MCNC: 137 MG/DL
GLOBULIN SER CALC-MCNC: 2.6 G/DL (ref 1.5–4.5)
GLUCOSE SERPL-MCNC: 105 MG/DL (ref 65–99)
HBA1C MFR BLD: 6.4 % (ref 4.8–5.6)
HDLC SERPL-MCNC: 40 MG/DL
INTERPRETATION, 910389: NORMAL
LDLC SERPL CALC-MCNC: 102 MG/DL (ref 0–99)
POTASSIUM SERPL-SCNC: 5.1 MMOL/L (ref 3.5–5.2)
PROT SERPL-MCNC: 7.2 G/DL (ref 6–8.5)
SODIUM SERPL-SCNC: 142 MMOL/L (ref 134–144)
TRIGL SERPL-MCNC: 216 MG/DL (ref 0–149)
VLDLC SERPL CALC-MCNC: 43 MG/DL (ref 5–40)

## 2017-05-27 RX ORDER — SIMVASTATIN 20 MG/1
TABLET, FILM COATED ORAL
Qty: 90 TAB | Refills: 3 | Status: SHIPPED | OUTPATIENT
Start: 2017-05-27 | End: 2018-06-15 | Stop reason: SDUPTHER

## 2017-05-27 NOTE — ACP (ADVANCE CARE PLANNING)
Discussed with patient, he has an advanced directive. Encouraged him to bring in a copy.  Angeli Walker MD 5/26/17

## 2017-05-31 NOTE — TELEPHONE ENCOUNTER
Malcolm Gallagher     -     [phone]    -  Patient out of medication and will be leaving to go out of town tomorrow -

## 2017-06-26 ENCOUNTER — HOSPITAL ENCOUNTER (OUTPATIENT)
Dept: ULTRASOUND IMAGING | Age: 67
Discharge: HOME | End: 2017-06-26
Attending: FAMILY MEDICINE
Payer: MEDICARE

## 2017-06-26 DIAGNOSIS — Z13.6 ENCOUNTER FOR ABDOMINAL AORTIC ANEURYSM (AAA) SCREENING: ICD-10-CM

## 2017-06-26 PROCEDURE — 76706 US ABDL AORTA SCREEN AAA: CPT

## 2018-06-01 ENCOUNTER — TELEPHONE (OUTPATIENT)
Dept: FAMILY MEDICINE CLINIC | Age: 68
End: 2018-06-01

## 2018-06-19 ENCOUNTER — HOSPITAL ENCOUNTER (OUTPATIENT)
Dept: LAB | Age: 68
Discharge: HOME | End: 2018-06-19
Payer: MEDICARE

## 2018-06-19 ENCOUNTER — OFFICE VISIT (OUTPATIENT)
Dept: FAMILY MEDICINE CLINIC | Age: 68
End: 2018-06-19

## 2018-06-19 VITALS
WEIGHT: 232 LBS | TEMPERATURE: 98.5 F | SYSTOLIC BLOOD PRESSURE: 133 MMHG | OXYGEN SATURATION: 96 % | RESPIRATION RATE: 18 BRPM | HEIGHT: 68 IN | DIASTOLIC BLOOD PRESSURE: 83 MMHG | BODY MASS INDEX: 35.16 KG/M2 | HEART RATE: 77 BPM

## 2018-06-19 DIAGNOSIS — Z23 ENCOUNTER FOR IMMUNIZATION: ICD-10-CM

## 2018-06-19 DIAGNOSIS — L57.0 ACTINIC KERATOSES: ICD-10-CM

## 2018-06-19 DIAGNOSIS — R73.02 GLUCOSE INTOLERANCE (IMPAIRED GLUCOSE TOLERANCE): ICD-10-CM

## 2018-06-19 DIAGNOSIS — Z12.11 COLON CANCER SCREENING: ICD-10-CM

## 2018-06-19 DIAGNOSIS — E66.01 SEVERE OBESITY (BMI 35.0-39.9): ICD-10-CM

## 2018-06-19 DIAGNOSIS — E78.5 HYPERLIPIDEMIA WITH TARGET LDL LESS THAN 130: ICD-10-CM

## 2018-06-19 DIAGNOSIS — Z00.00 MEDICARE ANNUAL WELLNESS VISIT, INITIAL: Primary | ICD-10-CM

## 2018-06-19 PROCEDURE — 80053 COMPREHEN METABOLIC PANEL: CPT

## 2018-06-19 PROCEDURE — 85025 COMPLETE CBC W/AUTO DIFF WBC: CPT

## 2018-06-19 PROCEDURE — 80061 LIPID PANEL: CPT

## 2018-06-19 PROCEDURE — 83036 HEMOGLOBIN GLYCOSYLATED A1C: CPT

## 2018-06-19 RX ORDER — SIMVASTATIN 20 MG/1
TABLET, FILM COATED ORAL
Qty: 90 TAB | Refills: 3 | Status: SHIPPED | OUTPATIENT
Start: 2018-06-19 | End: 2018-06-20 | Stop reason: SDUPTHER

## 2018-06-19 NOTE — PROGRESS NOTES
[place] [place] [address]  [address] [place], [place] [address] [place], [address]  [phone]             Date of visit: 6/19/2018       This is an Initial Medicare Annual Wellness Visit (AWV), (Performed more than 12 months after effective date of Medicare Part B enrollment and 12 months after last preventive visit, Once in a lifetime)    I have reviewed the patient's medical history in detail and updated the computerized patient record. Merline Ibarra is a 78 y.o. male   History obtained from: the patient.     Concerns today   (Patient understands that medical problems addressed today may incur additional cost as this is a preventive visit)  -cyst on mid-chest after getting bit by a large mosquito, still a knot there  -staying busy playing music, travelling  -history of BCC  -breaks out in rash with any sunblock, even zinc  (blisters)  -tries to keep covered up  -had a bad stomach bug diarrhea, fever, for 5 days, had not travelled; grandson had it  -still on statin, tolerating pretty well  -has glaucoma but all the drops have given him severe allergic response  -takes saw palmetto, used to get up frequently at night but has no urinary symptoms at all now    History     Patient Active Problem List   Diagnosis Code    Glucose intolerance (impaired glucose tolerance) R73.02    Hyperlipidemia with target LDL less than 130 E78.5    Inflammation of eyelid H01.9    Chalazion H00.19    Open-angle glaucoma H40.10X0    Posterior vitreous detachment H43.819    Primary open angle glaucoma H40.1190    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    Severe obesity (BMI 35.0-39.9) ([facility] [address] E66.01     Past Medical History:   Diagnosis Date    Arthritis of wrist 5/26/2017    Basal cell carcinoma     Hyperlipidemia LDL goal < 130 3/6/2015      Past Surgical History:   Procedure Laterality Date    HX HEENT      basal cell carcinoma     Allergies   Allergen Reactions    Lactose Nausea and Vomiting     GI upset     Lumigan [Bimatoprost] Hives     Eye lesions     Other Medication Hives     Sunscreen     Sunscreen Hives    Xalatan [Latanoprost] Hives     Current Outpatient Prescriptions   Medication Sig Dispense Refill    saw palmetto xtr/zinc picolin (SAW PALMETTO EXTRACT PO) Take  by mouth.  simvastatin (ZOCOR) 20 mg tablet TAKE ONE TABLET BY MOUTH ONCE DAILY IN THE EVENING 90 Tab 3    MV,MINERALS/FA/LYCOPENE/GINKGO (ONE-A-DAY MEN'S 50+ ADVANTAGE PO) Take  by mouth.  Cholecalciferol, Vitamin D3, (VITAMIN D3) 1,000 unit cap Take  by mouth.  VITAMIN B COMPLEX & VIT C NO.3 (VITAMIN B COMP AND C NO.3 PO) Take 1 tablet by mouth daily.  OMEGA-3 FATTY ACIDS (FISH OIL CONCENTRATE PO) Take 2,000 mg by mouth daily.  GINKGO BILOBA (GINKOBA PO) Take 120 mg by mouth daily. Family History   Problem Relation Age of Onset    No Known Problems Mother     No Known Problems Father      Social History   Substance Use Topics    Smoking status: Former Smoker     Packs/day: 0.25     Types: Cigarettes    Smokeless tobacco: Never Used      Comment: hardly smoked    Alcohol use No       Specialists/Care Team   [facility] has established care with the following healthcare providers:  Patient Care Team:  Mary Dodge MD as PCP - General (Family Practice)    [address]     Demographics   male  78 y.o. General Health Questions   -During the past 4 weeks:   -how would you rate your health in general? Good   -how often have you been bothered by feeling dizzy when standing up? never   -how much have you been bothered by bodily pain? Mildly, aby in wrists, doesn't take any meds, only happens every now and then, thinks worse after statin.  Some pain fingers   -Have you noticed any hearing difficulties? no   -has your physical and emotional health limited your social activities with family or friends? no    Emotional Health Questions   -Do you have a history of depression, anxiety, or emotional problems? Gets a little depressed in the winter, no SI, just needs to get out more  -Over the past 2 weeks, have you felt down, depressed or hopeless? no  -Over the past 2 weeks, have you felt little interest or pleasure in doing things? no    Health Habits   Please describe your diet habits: trying to eat well, mostly drinks water  Do you get 5 servings of fruits or vegetables daily? Yes, fco/passion juice, broccoli, spinach  Do you exercise regularly? Mows lawn, has machine he tries to use    Activities of Daily Living and Functional Status   -Do you need help with eating, walking, dressing, bathing, toileting, the phone, transportation, shopping, preparing meals, housework, laundry, medications or managing money? no  -In the past four weeks, was someone available to help you if you needed and wanted help with anything? yes  -Are you confident are you that you can control and manage most of your health problems? yes  -Have you been given information to help you keep track of your medications? yes  -How often do you have trouble taking your medications as prescribed? never    Fall Risk and Home Safety   Have you fallen 2 or more times in the past year? No, but fell once 2 weeks ago, tripped over a cord, hit elbow, knee, right great toenail  Does your home have rugs in the hallway, lack grab bars in the bathroom, lack handrails on the stairs or have poor lighting? no  Do you have smoke detectors and check them regularly? yes  Do you have difficulties driving a car? no  Do you always fasten your seat belt when you are in a car? yes    Review of Systems (if indicated for problems addressed today)   GI: denies hematochezia  Psych: denies suicidal ideation        Physical Examination     Vitals:    06/19/18 0944   BP: 133/83   Pulse: 77   Resp: 18   Temp: 98.5 °F (36.9 °C)   TempSrc: Oral   SpO2: 96%   Weight: 232 lb (105.2 kg)   Height: 5' 8\" (1.727 m)     Body mass index is 35.28 kg/(m^2).    No exam data present  Was the patient's timed Up & Go test unsteady or longer than 30 seconds? no    Evaluation of Cognitive Function   Mood/affect:  normal  Orientation: normal  Appearance: age appropriate  Family member/caregiver input: none    Additional exam if indicated for problems addressed today:  General: stated age, obese and in NAD  Neck: supple, symmetrical, trachea midline, no adenopathy and thyroid: not enlarged, symmetric, no tenderness/mass/nodules  Lungs:  clear to auscultation w/o rales, rhonchi, wheezes w/normal effort and no use of accessory muscles of respiration   Heart: regular rate and rhythm, S1, S2 normal, no murmur, click, rub or gallop  Abdomen: soft, nontender  Ext:  No edema noted. Wrists without swelling and normal ROM.  Small nodule right index finger PIP but no swelling or tenderness  Lymph: no cervical adenopathy appreciated  Skin:  Forearms with numerous erythematous scaly macules consistent with AK's, no other skin lesions seen on face, back, chest, or ears  Psych: alert and oriented to person, place, time and situation and Speech: appropriate quality, quantity and organization of sentences     Advice/Referrals/Counseling (as indicated)   Education and counseling provided for any problems identified above: diet, exercise, weight loss    Preventive Services     Health Maintenance   Topic Date Due    GLAUCOMA SCREENING Q2Y  11/14/2016    FOBT Q 1 YEAR AGE 50-75  01/04/2018    Pneumococcal 65+ Low/Medium Risk (2 of 2 - PPSV23) 05/26/2018    MEDICARE YEARLY EXAM  05/27/2018    Influenza Age 9 to Adult  08/01/2018    DTaP/Tdap/Td series (2 - Td) 06/24/2025    Hepatitis C Screening  Addressed    ZOSTER VACCINE AGE 60>  Addressed       (Preventive care checklist to be included in patient instructions)  Discussed today Done Previously Not Needed    X -23 X -13  Pneumococcal vaccines    x  Flu vaccine     x Hepatitis B vaccine (if at risk)   X rx sent   Shingles vaccine    x  TDAP vaccine     x Digital rectal exam     x PSA   x   Colorectal cancer screening     x Low-dose CT for lung cancer screening     x Bone density test    X has glaucoma but drops had severe side effects  Glaucoma screening      Cholesterol test      Diabetes screening test       Diabetes self-management class      Nutritionist referral for diabetes or renal disease     Discussion of Advance Directive   Discussed with Rosy Kim. Marely El his ability to prepare and advance directive in the case that an injury or illness causes him to be unable to make health care decisions. Thinks he has one, will check and bring it in for us to scan    Assessment/Plan   Z00.00    ICD-10-CM ICD-9-CM    1. Medicare annual wellness visit, initial Z00.00 V70.0    2. Actinic keratoses L57.0 702.0    3. Glucose intolerance (impaired glucose tolerance) R73.02 790.22 HEMOGLOBIN A1C WITH EAG   4. Hyperlipidemia with target LDL less than 130 E78.5 272.4 LIPID PANEL      METABOLIC PANEL, COMPREHENSIVE      simvastatin (ZOCOR) 20 mg tablet   5. Severe obesity (BMI 35.0-39.9) ([facility]) E66.01 278.01 CBC WITH AUTOMATED DIFF   6. Encounter for immunization Z23 V03.89 PNEUMOCOCCAL POLYSACCHARIDE VACCINE, 23-VALENT, ADULT OR IMMUNOSUPPRESSED PT DOSE,   7.  Colon cancer screening Z12.11 V76.51 OCCULT BLOOD, IMMUNOASSAY (FIT)       Orders Placed This Encounter    Pneumococcal Polysaccharide vaccine, 23-Valent, Adult or Immunocompromised    LIPID PANEL    METABOLIC PANEL, COMPREHENSIVE    HEMOGLOBIN A1C WITH EAG    CBC WITH AUTOMATED DIFF    OCCULT BLOOD, IMMUNOASSAY (FIT)    saw palmetto xtr/zinc picolin (SAW PALMETTO EXTRACT PO)    DISCONTD: varicella-zoster recombinant, PF, (SHINGRIX, PF,) 50 mcg/0.5 mL susr injection    simvastatin (ZOCOR) 20 mg tablet     Preventive care as above  Healthy lifestyle and weight loss encouraged  Advised to cut back on juice and to get more exercise  Will consider efudex this fall for AK's on arms  Not wanting to see eye doc as he has had such severe reactions to glaucoma drops in past  Declines colonoscopy but willing to do FOBT  Tolerating statin; advised him to continue it  Some arthritis in wrists but not bothering him often    Follow-up Disposition:  Return in about 5 months (around 11/19/2018) for Follow up. to check weight and AK's, consider Rhys Oconnell MD

## 2018-06-19 NOTE — PROGRESS NOTES
Chief Complaint   Patient presents with    Follow-up    Cholesterol Problem     1. Have you been to the ER, urgent care clinic since your last visit? Hospitalized since your last visit? No    2. Have you seen or consulted any other health care providers outside of the [address] [place] [address] since your last visit? Include any pap smears or colon screening.  No Yes

## 2018-06-19 NOTE — PATIENT INSTRUCTIONS

## 2018-06-19 NOTE — MR AVS SNAPSHOT
[address] [place] [address] 
[phone] Patient: Erasmo Chamberlain MRN: [id number] DMO:92/17/[birth year]42 Visit Information Date & Time Provider Department Dept. Phone Encounter #  
 6/19/2018  9:45 AM Zita Up, [address] [place] [address] [phone] [id number] Follow-up Instructions Return in about 5 months (around 11/19/2018) for Follow up. Upcoming Health Maintenance Date Due  
 GLAUCOMA SCREENING Q2Y 11/14/2016 FOBT Q 1 YEAR AGE 50-75 1/4/2018 Pneumococcal 65+ Low/Medium Risk (2 of 2 - PPSV23) 5/26/2018 MEDICARE YEARLY EXAM 5/27/2018 Influenza Age 5 to Adult 8/1/2018 DTaP/Tdap/Td series (2 - Td) 6/24/2025 Allergies as of 6/19/2018  Review Complete On: 6/19/2018 By: Zita Up MD  
  
 Severity Noted Reaction Type Reactions Lactose  02/27/2015    Nausea and Vomiting GI upset Lumigan [Bimatoprost]  04/28/2016    Hives Eye lesions Other Medication  02/27/2015    Hives Sunscreen Sunscreen  02/27/2015    Hives Xalatan [Latanoprost]  10/20/2015    Hives Current Immunizations  Reviewed on 6/12/2018 Name Date Influenza High Dose Vaccine PF 12/9/2016 Influenza Vaccine 10/15/2016 Influenza Vaccine (Quad) PF 10/20/2015 Pneumococcal Conjugate (PCV-13) 5/26/2017 Pneumococcal Polysaccharide (PPSV-23)  Incomplete Td 1/12/2008 Tdap 6/24/2015 Not reviewed this visit You Were Diagnosed With   
  
 Codes Comments Medicare annual wellness visit, initial    -  Primary ICD-10-CM: Z00.00 ICD-9-CM: V70.0 Actinic keratoses     ICD-10-CM: L57.0 ICD-9-CM: 702.0 Glucose intolerance (impaired glucose tolerance)     ICD-10-CM: R73.02 
ICD-9-CM: 790.22 Hyperlipidemia with target LDL less than 130     ICD-10-CM: E78.5 ICD-9-CM: 272.4  Severe obesity (BMI 35.0-39.9) (HCC)     ICD-10-CM: E66.01 
ICD-9-CM: 278.01   
 Encounter for immunization     ICD-10-CM: E17 ICD-9-CM: V03.89 Vitals BP Pulse Temp Resp Height(growth percentile) Weight(growth percentile) 133/83 (BP 1 Location: Left arm, BP Patient Position: Sitting) 77 98.5 °F (36.9 °C) (Oral) 18 5' 8\" (1.727 m) 232 lb (105.2 kg) SpO2 BMI Smoking Status 96% 35.28 kg/m2 Former Smoker BMI and BSA Data Body Mass Index Body Surface Area  
 35.28 kg/m 2 2.25 m 2 Preferred Pharmacy Pharmacy Name Phone [address] [phone] Your Updated Medication List  
  
   
This list is accurate as of 6/19/18 10:34 AM.  Always use your most recent med list.  
  
  
  
  
 FISH OIL CONCENTRATE PO Take 2,000 mg by mouth daily. GINKOBA PO Take 120 mg by mouth daily. ONE-A-DAY MEN'S 50+ ADVANTAGE PO Take  by mouth. SAW PALMETTO EXTRACT PO Take  by mouth. simvastatin 20 mg tablet Commonly known as:  ZOCOR  
TAKE ONE TABLET BY MOUTH ONCE DAILY IN THE EVENING  
  
 VITAMIN B COMP AND C NO.3 PO Take 1 tablet by mouth daily. VITAMIN D3 1,000 unit Cap Generic drug:  cholecalciferol Take  by mouth. Prescriptions Sent to Pharmacy Refills  
 simvastatin (ZOCOR) 20 mg tablet 3 Sig: TAKE ONE TABLET BY MOUTH ONCE DAILY IN THE EVENING Class: Normal  
 Pharmacy: [facility] [address] [facility] [address] Ph #: [phone] We Performed the Following CBC WITH AUTOMATED DIFF [08730 CPT(R)] HEMOGLOBIN A1C WITH EAG [44823 CPT(R)] LIPID PANEL [35669 CPT(R)] METABOLIC PANEL, COMPREHENSIVE [88305 CPT(R)] PNEUMOCOCCAL POLYSACCHARIDE VACCINE, 23-VALENT, ADULT OR IMMUNOSUPPRESSED PT DOSE, [46459 CPT(R)] Follow-up Instructions Return in about 5 months (around 11/19/2018) for Follow up. Patient Instructions Well Visit, Over 72: Care Instructions Your Care Instructions Physical exams can help you stay healthy. Your doctor has checked your overall health and may have suggested ways to take good care of yourself. He or she also may have recommended tests. At home, you can help prevent illness with healthy eating, regular exercise, and other steps. Follow-up care is a key part of your treatment and safety. Be sure to make and go to all appointments, and call your doctor if you are having problems. It's also a good idea to know your test results and keep a list of the medicines you take. How can you care for yourself at home? · Reach and stay at a healthy weight. This will lower your risk for many problems, such as obesity, diabetes, heart disease, and high blood pressure. · Get at least 30 minutes of exercise on most days of the week. Walking is a good choice. You also may want to do other activities, such as running, swimming, cycling, or playing tennis or team sports. · Do not smoke. Smoking can make health problems worse. If you need help quitting, talk to your doctor about stop-smoking programs and medicines. These can increase your chances of quitting for good. · Protect your skin from too much sun. When you're outdoors from 10 a.m. to 4 p.m., stay in the shade or cover up with clothing and a hat with a wide brim. Wear sunglasses that block UV rays. Even when it's cloudy, put broad-spectrum sunscreen (SPF 30 or higher) on any exposed skin. · See a dentist one or two times a year for checkups and to have your teeth cleaned. · Wear a seat belt in the car. · Limit alcohol to 2 drinks a day for men and 1 drink a day for women. Too much alcohol can cause health problems. Follow your doctor's advice about when to have certain tests. These tests can spot problems early. For men and women · Cholesterol. Your doctor will tell you how often to have this done based on your overall health and other things that can increase your risk for heart attack and stroke. · Blood pressure. Have your blood pressure checked during a routine doctor visit. Your doctor will tell you how often to check your blood pressure based on your age, your blood pressure results, and other factors. · Diabetes. Ask your doctor whether you should have tests for diabetes. · Vision. Experts recommend that you have yearly exams for glaucoma and other age-related eye problems. · Hearing. Tell your doctor if you notice any change in your hearing. You can have tests to find out how well you hear. · Colon cancer tests. Keep having colon cancer tests as your doctor recommends. You can have one of several types of tests. · Heart attack and stroke risk. At least every 4 to 6 years, you should have your risk for heart attack and stroke assessed. Your doctor uses factors such as your age, blood pressure, cholesterol, and whether you smoke or have diabetes to show what your risk for a heart attack or stroke is over the next 10 years. · Osteoporosis. Talk to your doctor about whether you should have a bone density test to find out whether you have thinning bones. Also ask your doctor about whether you should take calcium and vitamin D supplements. For women · Pap test and pelvic exam. You may no longer need a Pap test. Talk with your doctor about whether to stop or continue to have Pap tests. · Breast exam and mammogram. Ask how often you should have a mammogram, which is an X-ray of your breasts. A mammogram can spot breast cancer before it can be felt and when it is easiest to treat. · Thyroid disease. Talk to your doctor about whether to have your thyroid checked as part of a regular physical exam. Women have an increased chance of a thyroid problem. For men · Prostate exam. Talk to your doctor about whether you should have a blood test (called a PSA test) for prostate cancer.  Experts disagree on whether men should have this test. Some experts recommend that you discuss the benefits and risks of the test with your doctor. · Abdominal aortic aneurysm. Ask your doctor whether you should have a test to check for an aneurysm. You may need a test if you ever smoked or if your parent, brother, sister, or child has had an aneurysm. When should you call for help? Watch closely for changes in your health, and be sure to contact your doctor if you have any problems or symptoms that concern you. Where can you learn more? Go to http://fransisco-freddy.info/. Enter B954 in the search box to learn more about \"Well Visit, Over 65: Care Instructions. \" Current as of: May 12, 2017 Content Version: 11.4 © 4069-1771 OrSense. Care instructions adapted under license by [employer] (which disclaims liability or warranty for this information). If you have questions about a medical condition or this instruction, always ask your healthcare professional. [address] any warranty or liability for your use of this information. Introducing [facility]! Dear Jagjit Brown: Thank you for requesting a In Ovo account. Our records indicate that you already have an active In Ovo account. You can access your account anytime at [email or web address] Did you know that you can access your hospital and ER discharge instructions at any time in In Ovo? You can also review all of your test results from your hospital stay or ER visit. Additional Information If you have questions, please visit the Frequently Asked Questions section of the In Ovo website at [email or web address] Remember, In Ovo is NOT to be used for urgent needs. For medical emergencies, dial 911. Now available from your iPhone and Android! Please provide this summary of care documentation to your next provider. Your primary care clinician is listed as Raul Whitaker.  If you have any questions after today's visit, please call [phone].

## 2018-06-19 NOTE — ACP (ADVANCE CARE PLANNING)
Discussed with Lakisha Vázquez. Jesse Hogan his ability to prepare and advance directive in the case that an injury or illness causes him to be unable to make health care decisions.    Thinks he has one, will check and bring it in for us to scan

## 2018-06-20 ENCOUNTER — PATIENT MESSAGE (OUTPATIENT)
Dept: FAMILY MEDICINE CLINIC | Age: 68
End: 2018-06-20

## 2018-06-20 DIAGNOSIS — E78.5 HYPERLIPIDEMIA WITH TARGET LDL LESS THAN 130: ICD-10-CM

## 2018-06-20 LAB
ALBUMIN SERPL-MCNC: 4.4 G/DL (ref 3.6–4.8)
ALBUMIN/GLOB SERPL: 1.6 {RATIO} (ref 1.2–2.2)
ALP SERPL-CCNC: 74 IU/L (ref 39–117)
ALT SERPL-CCNC: 42 IU/L (ref 0–44)
AST SERPL-CCNC: 27 IU/L (ref 0–40)
BASOPHILS # BLD AUTO: 0 X10E3/UL (ref 0–0.2)
BASOPHILS NFR BLD AUTO: 0 %
BILIRUB SERPL-MCNC: 0.5 MG/DL (ref 0–1.2)
BUN SERPL-MCNC: 9 MG/DL (ref 8–27)
BUN/CREAT SERPL: 9 (ref 10–24)
CALCIUM SERPL-MCNC: 9.3 MG/DL (ref 8.6–10.2)
CHLORIDE SERPL-SCNC: 101 MMOL/L (ref 96–106)
CHOLEST SERPL-MCNC: 218 MG/DL (ref 100–199)
CO2 SERPL-SCNC: 22 MMOL/L (ref 20–29)
CREAT SERPL-MCNC: 1.02 MG/DL (ref 0.76–1.27)
EOSINOPHIL # BLD AUTO: 0.2 X10E3/UL (ref 0–0.4)
EOSINOPHIL NFR BLD AUTO: 2 %
ERYTHROCYTE [DISTWIDTH] IN BLOOD BY AUTOMATED COUNT: 14 % (ref 12.3–15.4)
EST. AVERAGE GLUCOSE BLD GHB EST-MCNC: 134 MG/DL
GFR SERPLBLD CREATININE-BSD FMLA CKD-EPI: 76 ML/MIN/1.73
GFR SERPLBLD CREATININE-BSD FMLA CKD-EPI: 88 ML/MIN/1.73
GLOBULIN SER CALC-MCNC: 2.7 G/DL (ref 1.5–4.5)
GLUCOSE SERPL-MCNC: 98 MG/DL (ref 65–99)
HBA1C MFR BLD: 6.3 % (ref 4.8–5.6)
HCT VFR BLD AUTO: 49.5 % (ref 37.5–51)
HDLC SERPL-MCNC: 41 MG/DL
HGB BLD-MCNC: 16.5 G/DL (ref 13–17.7)
IMM GRANULOCYTES # BLD: 0.1 X10E3/UL (ref 0–0.1)
IMM GRANULOCYTES NFR BLD: 1 %
INTERPRETATION, 910389: NORMAL
LDLC SERPL CALC-MCNC: 122 MG/DL (ref 0–99)
LYMPHOCYTES # BLD AUTO: 2.7 X10E3/UL (ref 0.7–3.1)
LYMPHOCYTES NFR BLD AUTO: 26 %
MCH RBC QN AUTO: 32.4 PG (ref 26.6–33)
MCHC RBC AUTO-ENTMCNC: 33.3 G/DL (ref 31.5–35.7)
MCV RBC AUTO: 97 FL (ref 79–97)
MONOCYTES # BLD AUTO: 0.6 X10E3/UL (ref 0.1–0.9)
MONOCYTES NFR BLD AUTO: 6 %
NEUTROPHILS # BLD AUTO: 6.8 X10E3/UL (ref 1.4–7)
NEUTROPHILS NFR BLD AUTO: 65 %
PLATELET # BLD AUTO: 305 X10E3/UL (ref 150–379)
POTASSIUM SERPL-SCNC: 4.9 MMOL/L (ref 3.5–5.2)
PROT SERPL-MCNC: 7.1 G/DL (ref 6–8.5)
RBC # BLD AUTO: 5.09 X10E6/UL (ref 4.14–5.8)
SODIUM SERPL-SCNC: 140 MMOL/L (ref 134–144)
TRIGL SERPL-MCNC: 277 MG/DL (ref 0–149)
VLDLC SERPL CALC-MCNC: 55 MG/DL (ref 5–40)
WBC # BLD AUTO: 10.4 X10E3/UL (ref 3.4–10.8)

## 2018-06-20 RX ORDER — SIMVASTATIN 20 MG/1
40 TABLET, FILM COATED ORAL
COMMUNITY
Start: 2018-06-20 | End: 2018-08-10 | Stop reason: SDUPTHER

## 2018-08-10 DIAGNOSIS — E78.5 HYPERLIPIDEMIA WITH TARGET LDL LESS THAN 130: ICD-10-CM

## 2018-08-10 RX ORDER — SIMVASTATIN 40 MG/1
40 TABLET, FILM COATED ORAL
Qty: 90 TAB | Refills: 0 | Status: SHIPPED | OUTPATIENT
Start: 2018-08-10 | End: 2018-12-31 | Stop reason: SDUPTHER

## 2018-08-10 NOTE — TELEPHONE ENCOUNTER
Patient is calling in regards to having medication called into pharmacy. He states that he will be out of town for 2 months and be returning back in September, he is requesting refill to be for the time he is away. Requested Prescriptions     Pending Prescriptions Disp Refills    simvastatin (ZOCOR) 20 mg tablet       Sig: Take 2 Tabs by mouth nightly.      Pharmacy on file verified     Best call back [phone]

## 2019-03-25 ENCOUNTER — OFFICE VISIT (OUTPATIENT)
Dept: FAMILY MEDICINE CLINIC | Age: 69
End: 2019-03-25

## 2019-03-25 VITALS
RESPIRATION RATE: 18 BRPM | WEIGHT: 228 LBS | DIASTOLIC BLOOD PRESSURE: 82 MMHG | SYSTOLIC BLOOD PRESSURE: 128 MMHG | HEIGHT: 68 IN | HEART RATE: 80 BPM | BODY MASS INDEX: 34.56 KG/M2 | TEMPERATURE: 98 F | OXYGEN SATURATION: 94 %

## 2019-03-25 DIAGNOSIS — M79.89 SOFT TISSUE MASS: Primary | ICD-10-CM

## 2019-03-25 RX ORDER — DOXYCYCLINE 100 MG/1
100 CAPSULE ORAL 2 TIMES DAILY
Qty: 14 CAP | Refills: 0 | Status: SHIPPED | OUTPATIENT
Start: 2019-03-25 | End: 2019-04-01

## 2019-03-25 NOTE — PROGRESS NOTES
Patient Name: Eliane Hamilton MRN: [id number] SUBJECTIVE Eliane Hamilton is a 80 y.o. male who presents with the following:  
 
Patient recalls that he was bit by a mosquito in September 2017 and a small bump has existed there since then. One week ago, his dog was sitting on his chest and accidentally scratched the bump and since then, the bump has become more painful, red, and bigger in size. Denies any discharge, fever, or treatments thus far. Review of Systems Constitutional: Negative for fever, malaise/fatigue and weight loss. Respiratory: Negative for cough, hemoptysis, shortness of breath and wheezing. Cardiovascular: Negative for chest pain, palpitations, leg swelling and PND. Gastrointestinal: Negative for abdominal pain, constipation, diarrhea, nausea and vomiting. The patient's medications, allergies, past medical history, surgical history, family history and social history were reviewed and updated where appropriate. Prior to Admission medications Medication Sig Start Date End Date Taking? Authorizing Provider  
simvastatin (ZOCOR) 40 mg tablet Take 1 Tab by mouth nightly. Needs appointment 12/31/18  Yes Jordin Santoro MD  
saw palmetto xtr/zinc picolin (SAW PALMETTO EXTRACT PO) Take  by mouth. Yes Provider, Historical  
MV,MINERALS/FA/LYCOPENE/GINKGO (ONE-A-DAY MEN'S 50+ ADVANTAGE PO) Take  by mouth. Yes Provider, Historical  
Cholecalciferol, Vitamin D3, (VITAMIN D3) 1,000 unit cap Take  by mouth. Yes Provider, Historical  
VITAMIN B COMPLEX & VIT C NO.3 (VITAMIN B COMP AND C NO.3 PO) Take 1 tablet by mouth daily. Yes Provider, Historical  
OMEGA-3 FATTY ACIDS (FISH OIL CONCENTRATE PO) Take 2,000 mg by mouth daily. Yes Provider, Historical  
GINKGO BILOBA (GINKOBA PO) Take 120 mg by mouth daily. Yes Provider, Historical  
 
 
Allergies Allergen Reactions  Lactose Nausea and Vomiting GI upset  Lumigan [Bimatoprost] Hives Eye lesions  Other Medication Hives Sunscreen  Sunscreen Hives  Xalatan [Latanoprost] Hives OBJECTIVE Visit Vitals /82 (BP 1 Location: Right arm, BP Patient Position: Sitting) Pulse 80 Temp 98 °F (36.7 °C) (Oral) Resp 18 Ht 5' 8\" (1.727 m) Wt 228 lb (103.4 kg) SpO2 94% BMI 34.67 kg/m² Physical Exam  
Constitutional: He is oriented to person, place, and time and well-developed, well-nourished, and in no distress. No distress. Eyes: Pupils are equal, round, and reactive to light. Conjunctivae and EOM are normal.  
Musculoskeletal: Normal range of motion. Neurological: He is alert and oriented to person, place, and time. Gait normal.  
Skin: Skin is warm and dry. He is not diaphoretic.  
2 x 2 cm soft/spongy palpable cyst on anterior chest wall with surrounding erythema and mild tenderness Psychiatric: Mood, memory, affect and judgment normal.  
Nursing note and vitals reviewed. ASSESSMENT AND PLAN Regi Moody is a 80 y.o. male who presents today for: 
 
1. Soft tissue mass Possible infected sebaceous cyst. Recommend abx as below with warm compresses. If no improvement, refer to general surgery for removal. 
- REFERRAL TO GENERAL SURGERY 
- doxycycline (MONODOX) 100 mg capsule; Take 1 Cap by mouth two (2) times a day for 7 days. Dispense: 14 Cap; Refill: 0 There are no discontinued medications. Follow-up and Dispositions · Return if symptoms worsen or fail to improve. Medication risks/benefits/costs/interactions/alternatives discussed with patient. Advised patient to call back or return to office if symptoms worsen/change/persist. If patient cannot reach us or should anything more severe/urgent arise he/she should proceed directly to the nearest emergency department. Discussed expected course/resolution/complications of diagnosis in detail with patient.  
Patient given a written after visit summary which includes his/her diagnoses, current medications and vitals. Patient expressed understanding with the diagnosis and plan. Delicia De La Paz M.D.

## 2019-03-25 NOTE — PROGRESS NOTES
Chief Complaint Patient presents with  Cyst  
  pt states a mosqito bit him while camping in Sept. and cyst formed was evaluated by Dr. Bucky Arana 6/19 but has grown since then, is red and painful 1. Have you been to the ER, urgent care clinic since your last visit? Hospitalized since your last visit? No 
 
2. Have you seen or consulted any other health care providers outside of the [address] [place] [address] since your last visit? Include any pap smears or colon screening.  No

## 2019-05-21 DIAGNOSIS — E78.5 HYPERLIPIDEMIA WITH TARGET LDL LESS THAN 130: ICD-10-CM

## 2019-05-21 RX ORDER — SIMVASTATIN 40 MG/1
40 TABLET, FILM COATED ORAL
Qty: 90 TAB | Refills: 0 | Status: SHIPPED | OUTPATIENT
Start: 2019-05-21 | End: 2019-08-21 | Stop reason: SDUPTHER

## 2019-05-21 NOTE — TELEPHONE ENCOUNTER
Last seen by DR Germain Harper on 6/19/18, he was to f/u in Nov.  He is due AWV in June.   appt scheduled 6/27/19

## 2019-05-21 NOTE — TELEPHONE ENCOUNTER
Pt. called in today requesting a 90-days supply refill on the following meds. Pharm on file verified. LOV 03/25/2019  Last refill. [id number]    Requested Prescriptions     Pending Prescriptions Disp Refills    simvastatin (ZOCOR) 40 mg tablet 90 Tab 0     Sig: Take 1 Tab by mouth nightly.  Needs appointment

## 2019-06-27 ENCOUNTER — HOSPITAL ENCOUNTER (OUTPATIENT)
Dept: LAB | Age: 69
Discharge: HOME | End: 2019-06-27
Payer: MEDICARE

## 2019-06-27 ENCOUNTER — OFFICE VISIT (OUTPATIENT)
Dept: FAMILY MEDICINE CLINIC | Age: 69
End: 2019-06-27

## 2019-06-27 VITALS
HEART RATE: 70 BPM | OXYGEN SATURATION: 95 % | WEIGHT: 227.2 LBS | HEIGHT: 68 IN | SYSTOLIC BLOOD PRESSURE: 139 MMHG | TEMPERATURE: 98.4 F | RESPIRATION RATE: 18 BRPM | BODY MASS INDEX: 34.43 KG/M2 | DIASTOLIC BLOOD PRESSURE: 85 MMHG

## 2019-06-27 DIAGNOSIS — L72.3 SEBACEOUS CYST: ICD-10-CM

## 2019-06-27 DIAGNOSIS — R73.02 GLUCOSE INTOLERANCE (IMPAIRED GLUCOSE TOLERANCE): ICD-10-CM

## 2019-06-27 DIAGNOSIS — E78.5 HYPERLIPIDEMIA WITH TARGET LDL LESS THAN 130: ICD-10-CM

## 2019-06-27 DIAGNOSIS — Z12.11 COLON CANCER SCREENING: ICD-10-CM

## 2019-06-27 DIAGNOSIS — N40.0 BPH WITHOUT URINARY OBSTRUCTION: ICD-10-CM

## 2019-06-27 DIAGNOSIS — E66.9 OBESITY (BMI 30.0-34.9): ICD-10-CM

## 2019-06-27 DIAGNOSIS — Z00.00 MEDICARE ANNUAL WELLNESS VISIT, SUBSEQUENT: Primary | ICD-10-CM

## 2019-06-27 DIAGNOSIS — N52.9 ERECTILE DYSFUNCTION, UNSPECIFIED ERECTILE DYSFUNCTION TYPE: ICD-10-CM

## 2019-06-27 DIAGNOSIS — H40.10X0 OPEN-ANGLE GLAUCOMA OF BOTH EYES, UNSPECIFIED GLAUCOMA STAGE, UNSPECIFIED OPEN-ANGLE GLAUCOMA TYPE: ICD-10-CM

## 2019-06-27 PROCEDURE — 83036 HEMOGLOBIN GLYCOSYLATED A1C: CPT

## 2019-06-27 PROCEDURE — 84153 ASSAY OF PSA TOTAL: CPT

## 2019-06-27 PROCEDURE — 36415 COLL VENOUS BLD VENIPUNCTURE: CPT

## 2019-06-27 PROCEDURE — 80053 COMPREHEN METABOLIC PANEL: CPT

## 2019-06-27 PROCEDURE — 80061 LIPID PANEL: CPT

## 2019-06-27 NOTE — PATIENT INSTRUCTIONS
Bring in your advanced directive Well Visit, Over 72: Care Instructions Your Care Instructions Physical exams can help you stay healthy. Your doctor has checked your overall health and may have suggested ways to take good care of yourself. He or she also may have recommended tests. At home, you can help prevent illness with healthy eating, regular exercise, and other steps. Follow-up care is a key part of your treatment and safety. Be sure to make and go to all appointments, and call your doctor if you are having problems. It's also a good idea to know your test results and keep a list of the medicines you take. How can you care for yourself at home? · Reach and stay at a healthy weight. This will lower your risk for many problems, such as obesity, diabetes, heart disease, and high blood pressure. · Get at least 30 minutes of exercise on most days of the week. Walking is a good choice. You also may want to do other activities, such as running, swimming, cycling, or playing tennis or team sports. · Do not smoke. Smoking can make health problems worse. If you need help quitting, talk to your doctor about stop-smoking programs and medicines. These can increase your chances of quitting for good. · Protect your skin from too much sun. When you're outdoors from 10 a.m. to 4 p.m., stay in the shade or cover up with clothing and a hat with a wide brim. Wear sunglasses that block UV rays. Even when it's cloudy, put broad-spectrum sunscreen (SPF 30 or higher) on any exposed skin. · See a dentist one or two times a year for checkups and to have your teeth cleaned. · Wear a seat belt in the car. · Limit alcohol to 2 drinks a day for men and 1 drink a day for women. Too much alcohol can cause health problems. Follow your doctor's advice about when to have certain tests. These tests can spot problems early. For men and women · Cholesterol.  Your doctor will tell you how often to have this done based on your overall health and other things that can increase your risk for heart attack and stroke. · Blood pressure. Have your blood pressure checked during a routine doctor visit. Your doctor will tell you how often to check your blood pressure based on your age, your blood pressure results, and other factors. · Diabetes. Ask your doctor whether you should have tests for diabetes. · Vision. Experts recommend that you have yearly exams for glaucoma and other age-related eye problems. · Hearing. Tell your doctor if you notice any change in your hearing. You can have tests to find out how well you hear. · Colon cancer tests. Keep having colon cancer tests as your doctor recommends. You can have one of several types of tests. · Heart attack and stroke risk. At least every 4 to 6 years, you should have your risk for heart attack and stroke assessed. Your doctor uses factors such as your age, blood pressure, cholesterol, and whether you smoke or have diabetes to show what your risk for a heart attack or stroke is over the next 10 years. · Osteoporosis. Talk to your doctor about whether you should have a bone density test to find out whether you have thinning bones. Also ask your doctor about whether you should take calcium and vitamin D supplements. For women · Pap test and pelvic exam. You may no longer need a Pap test. Talk with your doctor about whether to stop or continue to have Pap tests. · Breast exam and mammogram. Ask how often you should have a mammogram, which is an X-ray of your breasts. A mammogram can spot breast cancer before it can be felt and when it is easiest to treat. · Thyroid disease. Talk to your doctor about whether to have your thyroid checked as part of a regular physical exam. Women have an increased chance of a thyroid problem. For men · Prostate exam. Talk to your doctor about whether you should have a blood test (called a PSA test) for prostate cancer.  Experts disagree on whether men should have this test. Some experts recommend that you discuss the benefits and risks of the test with your doctor. · Abdominal aortic aneurysm. Ask your doctor whether you should have a test to check for an aneurysm. You may need a test if you ever smoked or if your parent, brother, sister, or child has had an aneurysm. When should you call for help? Watch closely for changes in your health, and be sure to contact your doctor if you have any problems or symptoms that concern you. Where can you learn more? Go to http://fransisco-freddy.info/. Enter X329 in the search box to learn more about \"Well Visit, Over 65: Care Instructions. \" Current as of: March 28, 2018 Content Version: 11.9 © 8537-2844 [employer]ity. Care instructions adapted under license by [employer] (which disclaims liability or warranty for this information). If you have questions about a medical condition or this instruction, always ask your healthcare professional. [place] [address] any warranty or liability for your use of this information. Discussed today Done Previously Not Needed X both  Pneumococcal vaccines  
 x  Flu vaccine  
  x Hepatitis B vaccine (if at risk) X rx sent   Shingles vaccine  
 x  TDAP vaccine  
  x Digital rectal exam  
  x PSA X FOBT   Colorectal cancer screening  
  x Low-dose CT for lung cancer screening  
  x Bone density test  
 X has glaucoma but drops had severe side effects so doesn't keep going  Glaucoma screening  
x   Cholesterol test  
x   Diabetes screening test   
  x Diabetes self-management class  
  x Nutritionist referral for diabetes or renal disease

## 2019-06-27 NOTE — PROGRESS NOTES
Reviewed record in preparation for visit and have obtained necessary documentation. Identified pt with two pt identifiers(name and ). Health Maintenance Due   Topic    Shingrix Vaccine Age 50> (1 of 2)    GLAUCOMA SCREENING Q2Y     FOBT Q 1 YEAR AGE 54-65     MEDICARE YEARLY EXAM          Chief Complaint   Patient presents with   [address] [place] [address] Annual Wellness Visit     Patient fasting for lab        Wt Readings from Last 3 Encounters:   19 227 lb 3.2 oz (103.1 kg)   19 228 lb (103.4 kg)   18 232 lb (105.2 kg)     Temp Readings from Last 3 Encounters:   19 98 °F (36.7 °C) (Oral)   18 98.5 °F (36.9 °C) (Oral)   17 98 °F (36.7 °C) (Oral)     BP Readings from Last 3 Encounters:   19 128/82   18 133/83   17 132/82     Pulse Readings from Last 3 Encounters:   19 80   18 77   17 75           Learning Assessment:  :     Learning Assessment 2019   PRIMARY LEARNER Patient Patient   HIGHEST LEVEL OF EDUCATION - PRIMARY LEARNER  TRADE SCHOOL GRADUATED HIGH SCHOOL OR GED   BARRIERS PRIMARY LEARNER NONE NONE   CO-LEARNER CAREGIVER - No   PRIMARY LANGUAGE ENGLISH ENGLISH   LEARNER PREFERENCE PRIMARY READING DEMONSTRATION   ANSWERED BY patient patient   RELATIONSHIP SELF SELF       Depression Screening:  :     3 most recent PHQ Screens 2019   Little interest or pleasure in doing things Not at all   Feeling down, depressed, irritable, or hopeless Not at all   Total Score PHQ 2 0       Fall Risk Assessment:  :     Fall Risk Assessment, last 12 mths 2019   Able to walk? Yes   Fall in past 12 months? Yes   Fall with injury? No   Number of falls in past 12 months 1   Fall Risk Score 1       Abuse Screening:  :     Abuse Screening Questionnaire 2019   Do you ever feel afraid of your partner? N   Are you in a relationship with someone who physically or mentally threatens you? N   Is it safe for you to go home?  Y       Coordination of Care Questionnaire:  :     1) Have you been to an emergency room, urgent care clinic since your last visit? no   Hospitalized since your last visit? no             2) Have you seen or consulted any other health care providers outside of [address] [place] [address] since your last visit? no  (Include any pap smears or colon screenings in this section.)    3) Do you have an Advance Directive on file? no    4) Are you interested in receiving information on Advance Directives? NO      Patient is accompanied by self I have received verbal consent from [employer] [place] to discuss any/all medical information while they are present in the room.

## 2019-06-27 NOTE — PROGRESS NOTES
[facility]  [address] [address] [place], [address]  [phone]             Date of visit: 6/27/19       This is a Subsequent Medicare Annual Wellness Visit (AWV), (Performed more than 12 months after effective date of Medicare Part B enrollment and 12 months after last preventive visit)    I have reviewed the patient's medical history in detail and updated the computerized patient record. Marcelina Geronimo is a 80 y.o. male   History obtained from: the patient. Concerns today     -feeling well overall, doing a lot of camping, enjoying shelter  -cyst on mid-chest after getting bit by a large mosquito, still a knot there  Was here a few months ago when it was large, red and infected. Ended up not using the antibiotic given  Better after using a snake bite suction kit  It drained and continues to get smaller    Weight is gradually coming down  He is trying  More active in warmer months    No changes in vision. Usual floater in right eye for 4 years  Stopped seeing eye doctor as he was allergic to all the shots they gave him. Not wanting to go back  -uses MJ, grows himself  When he smokes MJ his vision clears up perfectly    Concerned about DM. Will do labs today    -has been on saw palmetto for about 2 years, works well for him  symptoms minimal  Due for PSA check    Doesn't want colonoscopy but willing to do FOBT    Notes the gingko biloba works perfectly for his ED    History     Patient Active Problem List   Diagnosis Code    Glucose intolerance (impaired glucose tolerance) R73.02    Hyperlipidemia with target LDL less than 130 E78.5    Inflammation of eyelid H01.9    Chalazion H00.19    Open-angle glaucoma H40.10X0    Posterior vitreous detachment H43.819    Primary open angle glaucoma H40.1190    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 9) E66.9    BPH without urinary obstruction N40.0    Sebaceous cyst L72.3    Erectile dysfunction N52.9 Past Medical History:   Diagnosis Date    Arthritis of wrist 5/26/2017    Basal cell carcinoma     Hyperlipidemia LDL goal < 130 3/6/2015      Past Surgical History:   Procedure Laterality Date    HX HEENT      basal cell carcinoma     Allergies   Allergen Reactions    Lactose Nausea and Vomiting     GI upset     Lumigan [Bimatoprost] Hives     Eye lesions     Other Medication Hives     Sunscreen     Sunscreen Hives    Xalatan [Latanoprost] Hives     Current Outpatient Medications   Medication Sig Dispense Refill    simvastatin (ZOCOR) 40 mg tablet Take 1 Tab by mouth nightly. Needs appointment 90 Tab 0    saw palmetto xtr/zinc picolin (SAW PALMETTO EXTRACT PO) Take  by mouth.  MV,MINERALS/FA/LYCOPENE/GINKGO (ONE-A-DAY MEN'S 50+ ADVANTAGE PO) Take  by mouth.  Cholecalciferol, Vitamin D3, (VITAMIN D3) 1,000 unit cap Take  by mouth.  VITAMIN B COMPLEX & VIT C NO.3 (VITAMIN B COMP AND C NO.3 PO) Take 1 tablet by mouth daily.  OMEGA-3 FATTY ACIDS (FISH OIL CONCENTRATE PO) Take 2,000 mg by mouth daily.  GINKGO BILOBA (GINKOBA PO) Take 120 mg by mouth daily. Family History   Problem Relation Age of Onset    No Known Problems Mother     No Known Problems Father      Social History     Tobacco Use    Smoking status: Former Smoker     Packs/day: 0.25     Types: Cigarettes    Smokeless tobacco: Never Used    Tobacco comment: hardly smoked   Substance Use Topics    Alcohol use: No       Specialists/Care Team   Andrews Robles Jessi has established care with the following healthcare providers:  Patient Care Team:  Monie Jeronimo MD as PCP - General (Family Practice)    [address]     Demographics   male  80 y.o. General Health Questions   -During the past 4 weeks:   -how would you rate your health in general? Good   -how often have you been bothered by feeling dizzy when standing up? never   -how much have you been bothered by bodily pain?  Mildly, in hands/wrists when plays guitar   -Have you noticed any hearing difficulties? no   -has your physical and emotional health limited your social activities with family or friends? no    Emotional Health Questions   -Do you have a history of depression, anxiety, or emotional problems? Gets a little depressed in the winter at times    3 most recent PHQ Screens 6/27/2019   Little interest or pleasure in doing things Not at all   Feeling down, depressed, irritable, or hopeless Not at all   Total Score PHQ 2 0      Health Habits   Please describe your diet habits: trying to eat well, mostly drinks water  Do you get 5 servings of fruits or vegetables daily? Yes, fco/passion juice, broccoli, spinach  Do you exercise regularly? Mows lawn, has machine he tries to use, also riding bike regularly, started that recently. Has a rowing machine he can use in the winter but has not really been in the habit of using it in the winter    Activities of Daily Living and Functional Status   -Do you need help with eating, walking, dressing, bathing, toileting, the phone, transportation, shopping, preparing meals, housework, laundry, medications or managing money? no  -In the past four weeks, was someone available to help you if you needed and wanted help with anything? yes  -Are you confident are you that you can control and manage most of your health problems? yes  -Have you been given information to help you keep track of your medications? yes  -How often do you have trouble taking your medications as prescribed? never    Fall Risk and Home Safety   Have you fallen 2 or more times in the past year? No  Does your home have rugs in the hallway, lack grab bars in the bathroom, lack handrails on the stairs or have poor lighting? no  Do you have smoke detectors and check them regularly?  yes  Do you have difficulties driving a car? no  Do you always fasten your seat belt when you are in a car? yes    Review of Systems (if indicated for problems addressed today)   GI: denies hematochezia   admits to a bit of urgency unchanged, saw palmetto helps his urination symptoms, denies hematuria      Physical Examination     Vitals:    06/27/19 1020   BP: 139/85   Pulse: 70   Resp: 18   Temp: 98.4 °F (36.9 °C)   TempSrc: Oral   SpO2: 95%   Weight: 227 lb 3.2 oz (103.1 kg)   Height: 5' 8\" (1.727 m)     Body mass index is 34.55 kg/m². No exam data present  Was the patient's timed Up & Go test unsteady or longer than 30 seconds? no    Evaluation of Cognitive Function   Mood/affect:  normal  Orientation: normal  Appearance: age appropriate  Family member/caregiver input: none    Additional exam if indicated for problems addressed today:  General: stated age,obese, and in NAD  Eyes: PERRL, EOMI, no redness or drainage  Nose: no drainage  Mouth: no lesions  Throat: no erythema, exudate or swelling  Neck: supple, symmetrical, trachea midline, no adenopathy and thyroid: not enlarged, symmetric, no tenderness/mass/nodules  Lungs:  clear to auscultation w/o rales, rhonchi, wheezes w/normal effort and no use of accessory muscles of respiration   Heart: regular rate and rhythm, S1, S2 normal, no murmur, click, rub or gallop  Abdomen: soft, nontender, no masses  Ext:  No edema noted.    Lymph: no cervical adenopathy appreciated  Skin:  Small, approx 8mm nodule in central chest skin, nontender, not red, not draining, does feel like sebaceous cyst  Neuro: normal gait, CN 2-12 intact  Psych: alert and oriented to person, place, time and situation and Speech: appropriate quality, quantity and organization of sentences and normal affect    Advice/Referrals/Counseling (as indicated)   Education and counseling provided for any problems identified above: diet, exercise, weight loss    Preventive Services     Health Maintenance   Topic Date Due    Shingrix Vaccine Age 50> (1 of 2) 11/14/2001    GLAUCOMA SCREENING Q2Y  11/14/2016    FOBT Q 1 YEAR AGE 50-75  01/04/2018    MEDICARE YEARLY EXAM  06/20/2019    Influenza Age 5 to Adult  08/01/2019    DTaP/Tdap/Td series (2 - Td) 06/24/2025    Pneumococcal 65+ years  Completed    Hepatitis C Screening  Addressed       (Preventive care checklist to be included in patient instructions)  Discussed today Done Previously Not Needed     X both  Pneumococcal vaccines    x  Flu vaccine     x Hepatitis B vaccine (if at risk)   X rx sent   Shingles vaccine    x  TDAP vaccine     x Digital rectal exam     x PSA   X FOBT   Colorectal cancer screening     x Low-dose CT for lung cancer screening     x Bone density test    X has glaucoma but drops had severe side effects so doesn't keep going  Glaucoma screening   x   Cholesterol test   x   Diabetes screening test      x Diabetes self-management class     x Nutritionist referral for diabetes or renal disease     Discussion of Advance Directive   Discussed with Barbie Rossi. Catalina Ask his ability to prepare and advance directive in the case that an injury or illness causes him to be unable to make health care decisions. Thinks he has one, will check and bring it in for us to scan    Assessment/Plan   Z00.00    ICD-10-CM ICD-9-CM    1. Medicare annual wellness visit, subsequent Z00.00 V70.0    2. Sebaceous cyst L72.3 706.2    3. Hyperlipidemia with target LDL less than 130 L26.5 299.2 METABOLIC PANEL, COMPREHENSIVE      LIPID PANEL   4. Glucose intolerance (impaired glucose tolerance) R73.02 790.22 HEMOGLOBIN A1C WITH EAG   5. Obesity (BMI 30.0-34. 9) E66.9 278.00    6. BPH without urinary obstruction N40.0 600.00 PSA W/ REFLX FREE PSA   7. Open-angle glaucoma of both eyes, unspecified glaucoma stage, unspecified open-angle glaucoma type H40.10X0 365.10      365.70    8. Colon cancer screening Z12.11 V76.51 OCCULT BLOOD IMMUNOASSAY,DIAGNOSTIC   9.  Erectile dysfunction, unspecified erectile dysfunction type N52.9 607.84        Orders Placed This Encounter    METABOLIC PANEL, COMPREHENSIVE    LIPID PANEL  HEMOGLOBIN A1C WITH EAG    OCCULT BLOOD IMMUNOASSAY,DIAGNOSTIC    PSA W/ REFLX FREE PSA    CVD REPORT    varicella-zoster recombinant, PF, (SHINGRIX, PF,) 50 mcg/0.5 mL susr injection     Preventive care as above  Healthy lifestyle and weight loss encouraged; has made some improvement  Not wanting to see eye doc as he has had such severe reactions to glaucoma drops in past  Encouraged him to go and see if they have anything new to offer  Declines colonoscopy but willing to do FOBT  Tolerating statin; advised him to continue it  Some arthritis in wrists but not bothering him often  Continue saw palmetto, works well for his mild BPH symptoms, check psa today  He is rightly concerned about DM, will consider adding metformin if a1c very high  Continue ginko biloba prn for ED, works well for him    Follow-up and Dispositions    · Return in about 1 year (around 6/27/2020) for Follow up weight.        Thalia Reddy MD

## 2019-06-28 PROBLEM — L72.3 SEBACEOUS CYST: Status: ACTIVE | Noted: 2019-06-28

## 2019-06-28 PROBLEM — E66.01 SEVERE OBESITY WITH BODY MASS INDEX (BMI) OF 35.0 TO 39.9 WITH SERIOUS COMORBIDITY (HCC): Status: RESOLVED | Noted: 2018-06-19 | Resolved: 2019-06-28

## 2019-06-28 PROBLEM — N40.0 BPH WITHOUT URINARY OBSTRUCTION: Status: ACTIVE | Noted: 2019-06-28

## 2019-06-28 PROBLEM — E66.9 OBESITY (BMI 30.0-34.9): Status: ACTIVE | Noted: 2019-06-28

## 2019-06-28 PROBLEM — N52.9 ERECTILE DYSFUNCTION: Status: ACTIVE | Noted: 2019-06-28

## 2019-06-28 LAB
ALBUMIN SERPL-MCNC: 4.7 G/DL (ref 3.6–4.8)
ALBUMIN/GLOB SERPL: 1.9 {RATIO} (ref 1.2–2.2)
ALP SERPL-CCNC: 69 IU/L (ref 39–117)
ALT SERPL-CCNC: 32 IU/L (ref 0–44)
AST SERPL-CCNC: 27 IU/L (ref 0–40)
BILIRUB SERPL-MCNC: 0.6 MG/DL (ref 0–1.2)
BUN SERPL-MCNC: 11 MG/DL (ref 8–27)
BUN/CREAT SERPL: 11 (ref 10–24)
CALCIUM SERPL-MCNC: 9.1 MG/DL (ref 8.6–10.2)
CHLORIDE SERPL-SCNC: 102 MMOL/L (ref 96–106)
CHOLEST SERPL-MCNC: 168 MG/DL (ref 100–199)
CO2 SERPL-SCNC: 19 MMOL/L (ref 20–29)
CREAT SERPL-MCNC: 1.01 MG/DL (ref 0.76–1.27)
EST. AVERAGE GLUCOSE BLD GHB EST-MCNC: 128 MG/DL
GLOBULIN SER CALC-MCNC: 2.5 G/DL (ref 1.5–4.5)
GLUCOSE SERPL-MCNC: 90 MG/DL (ref 65–99)
HBA1C MFR BLD: 6.1 % (ref 4.8–5.6)
HDLC SERPL-MCNC: 44 MG/DL
INTERPRETATION, 910389: NORMAL
LDLC SERPL CALC-MCNC: 84 MG/DL (ref 0–99)
POTASSIUM SERPL-SCNC: 5.1 MMOL/L (ref 3.5–5.2)
PROT SERPL-MCNC: 7.2 G/DL (ref 6–8.5)
PSA SERPL-MCNC: 1.3 NG/ML (ref 0–4)
REFLEX CRITERIA: NORMAL
SODIUM SERPL-SCNC: 139 MMOL/L (ref 134–144)
TRIGL SERPL-MCNC: 199 MG/DL (ref 0–149)
VLDLC SERPL CALC-MCNC: 40 MG/DL (ref 5–40)

## 2019-06-28 NOTE — ACP (ADVANCE CARE PLANNING)
Discussed with Laura Leal. Marc Bares his ability to prepare and advance directive in the case that an injury or illness causes him to be unable to make health care decisions.    Thinks he has one, will check and bring it in for us to scan

## 2019-09-03 ENCOUNTER — HOSPITAL ENCOUNTER (OUTPATIENT)
Dept: LAB | Age: 69
Discharge: HOME | End: 2019-09-03
Payer: MEDICARE

## 2019-09-03 PROCEDURE — 82270 OCCULT BLOOD FECES: CPT

## 2019-09-05 LAB
HEMOCCULT STL QL IA: NEGATIVE
SPECIMEN STATUS REPORT, ROLRST: NORMAL

## 2020-08-26 ENCOUNTER — TELEPHONE (OUTPATIENT)
Dept: FAMILY MEDICINE CLINIC | Age: 70
End: 2020-08-26

## 2020-08-26 NOTE — TELEPHONE ENCOUNTER
----- Message from Reji Edward sent at 8/26/2020 11:00 AM EDT -----  Regarding: Dr Arora Guess: [phone]  Patient return call    Caller's first and last name and relationship (if not the patient):      Best contact number(s):[phone]      Whose call is being returned:not sure      Details to clarify the request:      Reji Edward

## 2020-08-27 ENCOUNTER — HOSPITAL ENCOUNTER (OUTPATIENT)
Dept: LAB | Age: 70
Discharge: HOME | End: 2020-08-27
Payer: MEDICARE

## 2020-08-27 ENCOUNTER — OFFICE VISIT (OUTPATIENT)
Dept: FAMILY MEDICINE CLINIC | Age: 70
End: 2020-08-27
Payer: MEDICARE

## 2020-08-27 VITALS
DIASTOLIC BLOOD PRESSURE: 71 MMHG | BODY MASS INDEX: 34.75 KG/M2 | RESPIRATION RATE: 18 BRPM | TEMPERATURE: 96.8 F | WEIGHT: 216.2 LBS | SYSTOLIC BLOOD PRESSURE: 101 MMHG | HEIGHT: 66 IN | OXYGEN SATURATION: 96 % | HEART RATE: 75 BPM

## 2020-08-27 DIAGNOSIS — Z00.00 MEDICARE ANNUAL WELLNESS VISIT, SUBSEQUENT: Primary | ICD-10-CM

## 2020-08-27 DIAGNOSIS — N52.9 ERECTILE DYSFUNCTION, UNSPECIFIED ERECTILE DYSFUNCTION TYPE: ICD-10-CM

## 2020-08-27 DIAGNOSIS — Z12.11 COLON CANCER SCREENING: ICD-10-CM

## 2020-08-27 DIAGNOSIS — N40.0 BPH WITHOUT URINARY OBSTRUCTION: ICD-10-CM

## 2020-08-27 DIAGNOSIS — E78.5 HYPERLIPIDEMIA WITH TARGET LDL LESS THAN 130: ICD-10-CM

## 2020-08-27 DIAGNOSIS — R73.02 GLUCOSE INTOLERANCE (IMPAIRED GLUCOSE TOLERANCE): ICD-10-CM

## 2020-08-27 DIAGNOSIS — E66.9 OBESITY (BMI 30.0-34.9): ICD-10-CM

## 2020-08-27 DIAGNOSIS — H40.10X0 OPEN-ANGLE GLAUCOMA OF BOTH EYES, UNSPECIFIED GLAUCOMA STAGE, UNSPECIFIED OPEN-ANGLE GLAUCOMA TYPE: ICD-10-CM

## 2020-08-27 PROCEDURE — 1101F PT FALLS ASSESS-DOCD LE1/YR: CPT | Performed by: FAMILY MEDICINE

## 2020-08-27 PROCEDURE — 84443 ASSAY THYROID STIM HORMONE: CPT

## 2020-08-27 PROCEDURE — 83036 HEMOGLOBIN GLYCOSYLATED A1C: CPT

## 2020-08-27 PROCEDURE — G8536 NO DOC ELDER MAL SCRN: HCPCS | Performed by: FAMILY MEDICINE

## 2020-08-27 PROCEDURE — G8510 SCR DEP NEG, NO PLAN REQD: HCPCS | Performed by: FAMILY MEDICINE

## 2020-08-27 PROCEDURE — G8427 DOCREV CUR MEDS BY ELIG CLIN: HCPCS | Performed by: FAMILY MEDICINE

## 2020-08-27 PROCEDURE — G0439 PPPS, SUBSEQ VISIT: HCPCS | Performed by: FAMILY MEDICINE

## 2020-08-27 PROCEDURE — 99214 OFFICE O/P EST MOD 30 MIN: CPT | Performed by: FAMILY MEDICINE

## 2020-08-27 PROCEDURE — 80061 LIPID PANEL: CPT

## 2020-08-27 PROCEDURE — 36415 COLL VENOUS BLD VENIPUNCTURE: CPT

## 2020-08-27 PROCEDURE — 3017F COLORECTAL CA SCREEN DOC REV: CPT | Performed by: FAMILY MEDICINE

## 2020-08-27 PROCEDURE — G0444 DEPRESSION SCREEN ANNUAL: HCPCS | Performed by: FAMILY MEDICINE

## 2020-08-27 PROCEDURE — 80053 COMPREHEN METABOLIC PANEL: CPT

## 2020-08-27 PROCEDURE — G0463 HOSPITAL OUTPT CLINIC VISIT: HCPCS | Performed by: FAMILY MEDICINE

## 2020-08-27 PROCEDURE — G8417 CALC BMI ABV UP PARAM F/U: HCPCS | Performed by: FAMILY MEDICINE

## 2020-08-27 RX ORDER — SIMVASTATIN 40 MG/1
TABLET, FILM COATED ORAL
Qty: 90 TAB | Refills: 3 | Status: SHIPPED | OUTPATIENT
Start: 2020-08-27 | End: 2021-08-26 | Stop reason: SDUPTHER

## 2020-08-27 RX ORDER — ZOSTER VACCINE RECOMBINANT, ADJUVANTED 50 MCG/0.5
0.5 KIT INTRAMUSCULAR ONCE
Qty: 0.5 ML | Refills: 1 | Status: SHIPPED | OUTPATIENT
Start: 2020-08-27 | End: 2020-08-27

## 2020-08-27 NOTE — PATIENT INSTRUCTIONS
Well Visit, Over 72: Care Instructions Your Care Instructions Physical exams can help you stay healthy. Your doctor has checked your overall health and may have suggested ways to take good care of yourself. He or she also may have recommended tests. At home, you can help prevent illness with healthy eating, regular exercise, and other steps. Follow-up care is a key part of your treatment and safety. Be sure to make and go to all appointments, and call your doctor if you are having problems. It's also a good idea to know your test results and keep a list of the medicines you take. How can you care for yourself at home? · Reach and stay at a healthy weight. This will lower your risk for many problems, such as obesity, diabetes, heart disease, and high blood pressure. · Get at least 30 minutes of exercise on most days of the week. Walking is a good choice. You also may want to do other activities, such as running, swimming, cycling, or playing tennis or team sports. · Do not smoke. Smoking can make health problems worse. If you need help quitting, talk to your doctor about stop-smoking programs and medicines. These can increase your chances of quitting for good. · Protect your skin from too much sun. When you're outdoors from 10 a.m. to 4 p.m., stay in the shade or cover up with clothing and a hat with a wide brim. Wear sunglasses that block UV rays. Even when it's cloudy, put broad-spectrum sunscreen (SPF 30 or higher) on any exposed skin. · See a dentist one or two times a year for checkups and to have your teeth cleaned. · Wear a seat belt in the car. Follow your doctor's advice about when to have certain tests. These tests can spot problems early. For men and women · Cholesterol. Your doctor will tell you how often to have this done based on your overall health and other things that can increase your risk for heart attack and stroke. · Blood pressure. Have your blood pressure checked during a routine doctor visit. Your doctor will tell you how often to check your blood pressure based on your age, your blood pressure results, and other factors. · Diabetes. Ask your doctor whether you should have tests for diabetes. · Vision. Experts recommend that you have yearly exams for glaucoma and other age-related eye problems. · Hearing. Tell your doctor if you notice any change in your hearing. You can have tests to find out how well you hear. · Colon cancer tests. Keep having colon cancer tests as your doctor recommends. You can have one of several types of tests. · Heart attack and stroke risk. At least every 4 to 6 years, you should have your risk for heart attack and stroke assessed. Your doctor uses factors such as your age, blood pressure, cholesterol, and whether you smoke or have diabetes to show what your risk for a heart attack or stroke is over the next 10 years. · Osteoporosis. Talk to your doctor about whether you should have a bone density test to find out whether you have thinning bones. Ask your doctor if you need to take a calcium plus vitamin D supplement. You may be able to get enough calcium and vitamin D through your diet. For women · Pap test and pelvic exam. You may no longer need a Pap test. Talk with your doctor about whether to stop or continue to have Pap tests. · Breast exam and mammogram. Ask how often you should have a mammogram, which is an X-ray of your breasts. A mammogram can spot breast cancer before it can be felt and when it is easiest to treat. · Thyroid disease. Talk to your doctor about whether to have your thyroid checked as part of a regular physical exam. Women have an increased chance of a thyroid problem. For men · Prostate exam. Talk to your doctor about whether you should have a blood test (called a PSA test) for prostate cancer.  Experts recommend that you discuss the benefits and risks of the test with your doctor before you decide whether to have this test. Some experts say that men ages 79 and older no longer need testing. · Abdominal aortic aneurysm. Ask your doctor whether you should have a test to check for an aneurysm. You may need a test if you ever smoked or if your parent, brother, sister, or child has had an aneurysm. When should you call for help? Watch closely for changes in your health, and be sure to contact your doctor if you have any problems or symptoms that concern you. Where can you learn more? Go to http://fransisco-freddy.info/ Enter K476 in the search box to learn more about \"Well Visit, Over 65: Care Instructions. \" Current as of: August 22, 2019               Content Version: 12.5 © 8976-2004 [employer]. Care instructions adapted under license by [employer] (which disclaims liability or warranty for this information). If you have questions about a medical condition or this instruction, always ask your healthcare professional. [address] any warranty or liability for your use of this information.

## 2020-08-27 NOTE — PROGRESS NOTES
Chief Complaint   Patient presents with    Complete Physical     1. Have you been to the ER, urgent care clinic since your last visit? Hospitalized since your last visit? No    2. Have you seen or consulted any other health care providers outside of the [address] [place] [address] since your last visit? Include any pap smears or colon screening.  No

## 2020-08-27 NOTE — PROGRESS NOTES
[facility]  [place], [address]  [phone]             Date of visit: 2020        This is a Subsequent Medicare Annual Wellness Visit (AWV), (Performed more than 12 months after effective date of Medicare Part B enrollment and 12 months after last preventive visit)    I have reviewed the patient's medical history in detail and updated the computerized patient record. Ariadna Zelaya is a 77 y.o. male   History obtained from: the patient.     Concerns today     Chief Complaint   Patient presents with    Complete Physical      Has had major surgeries at [facility] over past 4-5 months  Bone graft, implantation of titanium inserts for teeth implants  Had ibuprofen and tylenol/codeine; hates that stuff    Brother  of covid (adopted)  He is being careful not to get sick    -HLD on zocor due to recheck   No problems with it  Took some niacin he had at home, advised him to just stop it    Weight down 11lb from last year  Made a real effort  Active hiking/walking  About to take a hiking/fishing trip  Prediabetes with a1c 6.1 last year; had offered metformin     -still not seeing eye doc as he was allergic to the various meds they gave him and MJ works better, doesn't want to see another eye doctor    -has been on saw palmetto for several years, still working for him     Doesn't want colonoscopy but willing to do FOBT     Notes the gingko biloba works perfectly for his ED     Had a right hand skin lesion recently but scabbed over, gone now    History skin cancer on face  Hasn't seen derm in a while    History     Patient Active Problem List   Diagnosis Code    Glucose intolerance (impaired glucose tolerance) R73.02    Hyperlipidemia with target LDL less than 130 E78.5    Inflammation of eyelid H01.9    Chalazion H00.19    Open-angle glaucoma H40.10X0    Posterior vitreous detachment H43.819    Primary open angle glaucoma H40.1190    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 9) E66.9    BPH without urinary obstruction N40.0    Sebaceous cyst L72.3    Erectile dysfunction N52.9     Past Medical History:   Diagnosis Date    Arthritis of wrist 5/26/2017    Basal cell carcinoma     Hyperlipidemia LDL goal < 130 3/6/2015      Past Surgical History:   Procedure Laterality Date    HX HEENT      basal cell carcinoma     Allergies   Allergen Reactions    Lactose Nausea and Vomiting     GI upset     Lumigan [Bimatoprost] Hives     Eye lesions     Other Medication Hives     Sunscreen     Sunscreen Hives    Xalatan [Latanoprost] Hives     Current Outpatient Medications   Medication Sig Dispense Refill    simvastatin (ZOCOR) 40 mg tablet TAKE 1 TABLET BY MOUTH ONCE DAILY AT  NIGHT 90 Tab 3    varicella-zoster recombinant, PF, (Shingrix, PF,) 50 mcg/0.5 mL susr injection 0.5 mL by IntraMUSCular route once for 1 dose. 0.5 mL 1    saw palmetto xtr/zinc picolin (SAW PALMETTO EXTRACT PO) Take  by mouth.  MV,MINERALS/FA/LYCOPENE/GINKGO (ONE-A-DAY MEN'S 50+ ADVANTAGE PO) Take  by mouth.  Cholecalciferol, Vitamin D3, (VITAMIN D3) 1,000 unit cap Take  by mouth.  VITAMIN B COMPLEX & VIT C NO.3 (VITAMIN B COMP AND C NO.3 PO) Take 1 tablet by mouth daily.  OMEGA-3 FATTY ACIDS (FISH OIL CONCENTRATE PO) Take 2,000 mg by mouth daily.  GINKGO BILOBA (GINKOBA PO) Take 120 mg by mouth daily. Family History   Problem Relation Age of Onset    No Known Problems Mother     No Known Problems Father     Stroke Brother         (not blood relative)     Social History     Tobacco Use    Smoking status: Former Smoker     Packs/day: 0.25     Types: Cigarettes    Smokeless tobacco: Never Used    Tobacco comment: hardly smoked   Substance Use Topics    Alcohol use: No       Specialists/Care Team   Andrews De La Paz has established care with the following healthcare providers:  Patient Care Team:  Lu Blevins MD as PCP - General (Family Medicine)  Lu Blevins MD as PCP - [address] Provider    Health Risk Assessment     Demographics   male  77 y.o. General Health Questions   -During the past 4 weeks:   -how would you rate your health in general? Good   -how often have you been bothered by feeling dizzy when standing up? never    -how much have you been bothered by bodily pain? 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 No    -has your physical and emotional health limited your social activities with family or friends? no    Emotional Health Questions   -Do you have a history of depression, anxiety, or emotional problems? Gets a little depressed in the winter at times    3 most recent \[facility] [place] [facility]\"" 36 Screens 8/27/2020   Little interest or pleasure in doing things Not at all   Feeling down, depressed, irritable, or hopeless Not at all   Total Score PHQ 2 0      Health Habits   Please describe your diet habits: trying to eat well, mostly drinks water, almond milk/coffee  Do you get 5 servings of fruits or vegetables daily? Yes   Do you exercise regularly? Generally active, hikes/walks regularly    Activities of Daily Living and Functional Status   -Do you need help with eating, walking, dressing, bathing, toileting, the phone, transportation, shopping, preparing meals, housework, laundry, medications or managing money? No   -In the past four weeks, was someone available to help you if you needed and wanted help with anything? yes  -Are you confident are you that you can control and manage most of your health problems? yes  -Have you been given information to help you keep track of your medications? yes  -How often do you have trouble taking your medications as prescribed? never    Fall Risk and Home Safety   Have you fallen 2 or more times in the past year?  No   Does your home have rugs in the hallway, lack grab bars in the bathroom, lack handrails on the stairs or have poor lighting? no  Do you have smoke detectors and check them regularly? yes  Do you have difficulties driving a car? no  Do you always fasten your seat belt when you are in a car? yes    Review of Systems (if indicated for problems addressed today)   Card: denies chest pain  Pulm: denies shortness of breath     Physical Examination     Vitals:    08/27/20 0920   BP: 101/71   Pulse: 75   Resp: 18   Temp: 96.8 °F (36 °C)   TempSrc: Oral   SpO2: 96%   Weight: 216 lb 3.2 oz (98.1 kg)   Height: 5' 6.25\" (1.683 m)     Body mass index is 34.63 kg/m². No exam data present  Was the patient's timed Up & Go test unsteady or longer than 30 seconds? No    Evaluation of Cognitive Function   Mood/affect:  Normal   Orientation: normal  Appearance: age appropriate  Family member/caregiver input: none    Additional exam if indicated for problems addressed today:  General: stated age,obese, and in NAD  Eyes: PERRL, EOMI, no redness or drainage  Nose: no drainage  Ears: TMs clear, canals clear  Mouth: no lesions  Throat: no erythema, exudate or swelling  Neck: supple, symmetrical, trachea midline, no adenopathy and thyroid: not enlarged, symmetric, no tenderness/mass/nodules  Lungs:  clear to auscultation w/o rales, rhonchi, wheezes w/normal effort and no use of accessory muscles of respiration   Heart: regular rate and rhythm, S1, S2 normal, no murmur, click, rub or gallop  Abdomen: soft, nontender, no masses  Ext:  No edema noted.    Lymph: no cervical adenopathy appreciated  Skin:  Normal. and no rash or abnormalities   Neuro: normal gait, CN 2-12 intact  Psych: alert and oriented to person, place, time and situation and Speech: appropriate quality, quantity and organization of sentences    Advice/Referrals/Counseling (as indicated)   Education and counseling provided for any problems identified above: diet, exercise   Preventive Services     Health Maintenance   Topic Date Due    Shingrix Vaccine Age 50> (1 of 2) 11/14/2001    Medicare Yearly Exam  06/27/2020    Lipid Screen  06/27/2020    FOBT Q1Y Age 54-65  09/03/2020    GLAUCOMA SCREENING Q2Y  08/27/2025 (Originally 11/14/2016)    DTaP/Tdap/Td series (2 - Td) 06/24/2025    Pneumococcal 65+ years  Completed    Hepatitis C Screening  Addressed       (Preventive care checklist to be included in patient instructions)  Discussed today Done Previously Not Needed     X both  Pneumococcal vaccines   X he will do in fall x  Flu vaccine     x Hepatitis B vaccine (if at risk)   X rx given   Shingles vaccine    X 2015  TDAP vaccine     x Digital rectal exam    x  PSA   X FOBT today   Colorectal cancer screening     x Low-dose CT for lung cancer screening     x Bone density test    X has glaucoma but drops had severe side effects so doesn't keep going  Glaucoma screening   x x  Cholesterol test   x x  Diabetes screening test      x Diabetes self-management class     x Nutritionist referral for diabetes or renal disease     Discussion of Advance Directive   Discussed with Clinton Sarabia. Rosaura Saab his ability to prepare and advance directive in the case that an injury or illness causes him to be unable to make health care decisions. Thinks he has one, will check and bring it in for us to scan     Assessment/Plan   Z00.00    ICD-10-CM ICD-9-CM    1. Medicare annual wellness visit, subsequent  Z00.00 V70.0    2. Glucose intolerance (impaired glucose tolerance)  R73.02 790.22 HEMOGLOBIN A1C WITH EAG      TSH 3RD GENERATION   3. Hyperlipidemia with target LDL less than 130  Z91.1 708.0 METABOLIC PANEL, COMPREHENSIVE      LIPID PANEL      simvastatin (ZOCOR) 40 mg tablet   4. Obesity (BMI 30.0-34. 9)  E66.9 278.00    5. BPH without urinary obstruction  N40.0 600.00    6. Erectile dysfunction, unspecified erectile dysfunction type  N52.9 607.84    7.  Open-angle glaucoma of both eyes, unspecified glaucoma stage, unspecified open-angle glaucoma type  H40.10X0 365.10 365.70    8. Colon cancer screening  Z12.11 V76.51 OCCULT BLOOD IMMUNOASSAY,DIAGNOSTIC       Orders Placed This Encounter    METABOLIC PANEL, COMPREHENSIVE    LIPID PANEL    HEMOGLOBIN A1C WITH EAG    TSH 3RD GENERATION    OCCULT BLOOD IMMUNOASSAY,DIAGNOSTIC    simvastatin (ZOCOR) 40 mg tablet    varicella-zoster recombinant, PF, (Shingrix, PF,) 50 mcg/0.5 mL susr injection     Preventive care as above  Doing well overall  Encouraged to keep trying to lose weight as he has been doing  Healthy eating, exercise strongly encouraged  He is willing to stay on simvastatin  Routine labs today  ED and BPH doing well with herbal supplements  Not wanting another eye appointment    Follow-up and Dispositions    · Return in about 1 year (around 8/27/2021) for Annual Wellness Visit, Follow up.        Barbara Cook MD

## 2020-08-28 LAB
ALBUMIN SERPL-MCNC: 4.8 G/DL (ref 3.8–4.8)
ALBUMIN/GLOB SERPL: 1.8 {RATIO} (ref 1.2–2.2)
ALP SERPL-CCNC: 67 IU/L (ref 39–117)
ALT SERPL-CCNC: 29 IU/L (ref 0–44)
AST SERPL-CCNC: 24 IU/L (ref 0–40)
BILIRUB SERPL-MCNC: 0.4 MG/DL (ref 0–1.2)
BUN SERPL-MCNC: 16 MG/DL (ref 8–27)
BUN/CREAT SERPL: 18 (ref 10–24)
CALCIUM SERPL-MCNC: 9.3 MG/DL (ref 8.6–10.2)
CHLORIDE SERPL-SCNC: 99 MMOL/L (ref 96–106)
CHOLEST SERPL-MCNC: 190 MG/DL (ref 100–199)
CO2 SERPL-SCNC: 20 MMOL/L (ref 20–29)
CREAT SERPL-MCNC: 0.88 MG/DL (ref 0.76–1.27)
EST. AVERAGE GLUCOSE BLD GHB EST-MCNC: 126 MG/DL
GLOBULIN SER CALC-MCNC: 2.7 G/DL (ref 1.5–4.5)
GLUCOSE SERPL-MCNC: 103 MG/DL (ref 65–99)
HBA1C MFR BLD: 6 % (ref 4.8–5.6)
HDLC SERPL-MCNC: 45 MG/DL
INTERPRETATION, 910389: NORMAL
LDLC SERPL CALC-MCNC: 117 MG/DL (ref 0–99)
POTASSIUM SERPL-SCNC: 4.9 MMOL/L (ref 3.5–5.2)
PROT SERPL-MCNC: 7.5 G/DL (ref 6–8.5)
SODIUM SERPL-SCNC: 138 MMOL/L (ref 134–144)
TRIGL SERPL-MCNC: 140 MG/DL (ref 0–149)
TSH SERPL DL<=0.005 MIU/L-ACNC: 1.92 UIU/ML (ref 0.45–4.5)
VLDLC SERPL CALC-MCNC: 28 MG/DL (ref 5–40)

## 2020-11-09 ENCOUNTER — PATIENT MESSAGE (OUTPATIENT)
Dept: FAMILY MEDICINE CLINIC | Age: 70
End: 2020-11-09

## 2021-08-26 DIAGNOSIS — E78.5 HYPERLIPIDEMIA WITH TARGET LDL LESS THAN 130: ICD-10-CM

## 2021-08-26 RX ORDER — SIMVASTATIN 40 MG/1
40 TABLET, FILM COATED ORAL
Qty: 90 TABLET | Refills: 0 | Status: SHIPPED | OUTPATIENT
Start: 2021-08-26 | End: 2021-11-02 | Stop reason: SDUPTHER

## 2021-08-26 NOTE — TELEPHONE ENCOUNTER
Last visit 08/27/2020 MD Atkinson   Next appointment 11/02/2021 MD Inell Means   Previous refill encounter(s)   08/27/2020 Zocor #90 with 3 refills     Requested Prescriptions     Pending Prescriptions Disp Refills    simvastatin (ZOCOR) 40 mg tablet 90 Tablet 0     Sig: Take 1 Tablet by mouth nightly.

## 2021-08-26 NOTE — TELEPHONE ENCOUNTER
Patient has appointment 11/02/2021 patient only have 2 left would like enough till he see provider on 11/02/2021. Need refill    .   Requested Prescriptions     Pending Prescriptions Disp Refills    simvastatin (ZOCOR) 40 mg tablet 90 Tablet 3     Sig: TAKE 1 TABLET BY MOUTH ONCE DAILY AT  NIGHT     Best call back #[phone]

## 2021-11-02 ENCOUNTER — OFFICE VISIT (OUTPATIENT)
Dept: FAMILY MEDICINE CLINIC | Age: 71
End: 2021-11-02
Payer: MEDICARE

## 2021-11-02 VITALS
DIASTOLIC BLOOD PRESSURE: 65 MMHG | OXYGEN SATURATION: 97 % | SYSTOLIC BLOOD PRESSURE: 130 MMHG | WEIGHT: 216 LBS | RESPIRATION RATE: 16 BRPM | BODY MASS INDEX: 32.74 KG/M2 | HEART RATE: 65 BPM | HEIGHT: 68 IN

## 2021-11-02 DIAGNOSIS — E78.5 HYPERLIPIDEMIA WITH TARGET LDL LESS THAN 130: ICD-10-CM

## 2021-11-02 DIAGNOSIS — Z12.11 COLON CANCER SCREENING: ICD-10-CM

## 2021-11-02 DIAGNOSIS — Z23 ENCOUNTER FOR IMMUNIZATION: ICD-10-CM

## 2021-11-02 DIAGNOSIS — M25.531 PAIN IN BOTH WRISTS: ICD-10-CM

## 2021-11-02 DIAGNOSIS — Z13.39 SCREENING FOR ALCOHOLISM: ICD-10-CM

## 2021-11-02 DIAGNOSIS — R73.02 GLUCOSE INTOLERANCE (IMPAIRED GLUCOSE TOLERANCE): ICD-10-CM

## 2021-11-02 DIAGNOSIS — Z13.31 SCREENING FOR DEPRESSION: ICD-10-CM

## 2021-11-02 DIAGNOSIS — Z00.00 MEDICARE ANNUAL WELLNESS VISIT, SUBSEQUENT: Primary | ICD-10-CM

## 2021-11-02 DIAGNOSIS — M25.532 PAIN IN BOTH WRISTS: ICD-10-CM

## 2021-11-02 DIAGNOSIS — M19.039 ARTHRITIS OF WRIST: ICD-10-CM

## 2021-11-02 DIAGNOSIS — R73.03 PREDIABETES: ICD-10-CM

## 2021-11-02 DIAGNOSIS — L72.3 SEBACEOUS CYST: ICD-10-CM

## 2021-11-02 LAB
ALBUMIN SERPL-MCNC: 3.9 G/DL (ref 3.5–5)
ALBUMIN/GLOB SERPL: 1.3 {RATIO} (ref 1.1–2.2)
ALP SERPL-CCNC: 77 U/L (ref 45–117)
ALT SERPL-CCNC: 49 U/L (ref 12–78)
ANION GAP SERPL CALC-SCNC: 2 MMOL/L (ref 5–15)
APPEARANCE UR: CLEAR
AST SERPL-CCNC: 24 U/L (ref 15–37)
BILIRUB SERPL-MCNC: 0.5 MG/DL (ref 0.2–1)
BILIRUB UR QL: NEGATIVE
BUN SERPL-MCNC: 8 MG/DL (ref 6–20)
BUN/CREAT SERPL: 8 (ref 12–20)
CALCIUM SERPL-MCNC: 9 MG/DL (ref 8.5–10.1)
CHLORIDE SERPL-SCNC: 105 MMOL/L (ref 97–108)
CHOLEST SERPL-MCNC: 183 MG/DL
CO2 SERPL-SCNC: 31 MMOL/L (ref 21–32)
COLOR UR: NORMAL
CREAT SERPL-MCNC: 0.99 MG/DL (ref 0.7–1.3)
ERYTHROCYTE [DISTWIDTH] IN BLOOD BY AUTOMATED COUNT: 14.1 % (ref 11.5–14.5)
EST. AVERAGE GLUCOSE BLD GHB EST-MCNC: 120 MG/DL
GLOBULIN SER CALC-MCNC: 3.1 G/DL (ref 2–4)
GLUCOSE SERPL-MCNC: 98 MG/DL (ref 65–100)
GLUCOSE UR STRIP.AUTO-MCNC: NEGATIVE MG/DL
HBA1C MFR BLD: 5.8 % (ref 4–5.6)
HCT VFR BLD AUTO: 51.9 % (ref 36.6–50.3)
HDLC SERPL-MCNC: 44 MG/DL
HDLC SERPL: 4.2 {RATIO} (ref 0–5)
HGB BLD-MCNC: 16.5 G/DL (ref 12.1–17)
HGB UR QL STRIP: NEGATIVE
KETONES UR QL STRIP.AUTO: NEGATIVE MG/DL
LDLC SERPL CALC-MCNC: 102.4 MG/DL (ref 0–100)
LEUKOCYTE ESTERASE UR QL STRIP.AUTO: NEGATIVE
MCH RBC QN AUTO: 32.4 PG (ref 26–34)
MCHC RBC AUTO-ENTMCNC: 31.8 G/DL (ref 30–36.5)
MCV RBC AUTO: 101.8 FL (ref 80–99)
NITRITE UR QL STRIP.AUTO: NEGATIVE
NRBC # BLD: 0 K/UL (ref 0–0.01)
NRBC BLD-RTO: 0 PER 100 WBC
PH UR STRIP: 8 [PH] (ref 5–8)
PLATELET # BLD AUTO: 291 K/UL (ref 150–400)
PMV BLD AUTO: 11 FL (ref 8.9–12.9)
POTASSIUM SERPL-SCNC: 4.7 MMOL/L (ref 3.5–5.1)
PROT SERPL-MCNC: 7 G/DL (ref 6.4–8.2)
PROT UR STRIP-MCNC: NEGATIVE MG/DL
RBC # BLD AUTO: 5.1 M/UL (ref 4.1–5.7)
SODIUM SERPL-SCNC: 138 MMOL/L (ref 136–145)
SP GR UR REFRACTOMETRY: 1.02 (ref 1–1.03)
TRIGL SERPL-MCNC: 183 MG/DL (ref ?–150)
TSH SERPL DL<=0.05 MIU/L-ACNC: 1.39 UIU/ML (ref 0.36–3.74)
UROBILINOGEN UR QL STRIP.AUTO: 0.2 EU/DL (ref 0.2–1)
VLDLC SERPL CALC-MCNC: 36.6 MG/DL
WBC # BLD AUTO: 7.4 K/UL (ref 4.1–11.1)

## 2021-11-02 PROCEDURE — 1101F PT FALLS ASSESS-DOCD LE1/YR: CPT | Performed by: FAMILY MEDICINE

## 2021-11-02 PROCEDURE — G8417 CALC BMI ABV UP PARAM F/U: HCPCS | Performed by: FAMILY MEDICINE

## 2021-11-02 PROCEDURE — 3017F COLORECTAL CA SCREEN DOC REV: CPT | Performed by: FAMILY MEDICINE

## 2021-11-02 PROCEDURE — G8536 NO DOC ELDER MAL SCRN: HCPCS | Performed by: FAMILY MEDICINE

## 2021-11-02 PROCEDURE — 90662 IIV NO PRSV INCREASED AG IM: CPT | Performed by: FAMILY MEDICINE

## 2021-11-02 PROCEDURE — G8427 DOCREV CUR MEDS BY ELIG CLIN: HCPCS | Performed by: FAMILY MEDICINE

## 2021-11-02 PROCEDURE — G0442 ANNUAL ALCOHOL SCREEN 15 MIN: HCPCS | Performed by: FAMILY MEDICINE

## 2021-11-02 PROCEDURE — G0439 PPPS, SUBSEQ VISIT: HCPCS | Performed by: FAMILY MEDICINE

## 2021-11-02 PROCEDURE — G8432 DEP SCR NOT DOC, RNG: HCPCS | Performed by: FAMILY MEDICINE

## 2021-11-02 RX ORDER — SIMVASTATIN 40 MG/1
40 TABLET, FILM COATED ORAL
Qty: 90 TABLET | Refills: 3 | Status: SHIPPED | OUTPATIENT
Start: 2021-11-02

## 2021-11-02 NOTE — PROGRESS NOTES
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 He reports that he has also noticed some swelling in the area. He reports that he had six dental implants this year  Glaucoma: he reports using cannibus daily; he is followed by [facility], but he reports that he stopped going d/t being allergic to the drops that they prescribed. He reports that his vision has been good recently. Prediabetes: last A1c was 6.0 in Aug, 202  Obesity: he reports that he has been eating healthier and has lost wieght  He has had 2/2 COVID vaccine  He had 2/2 Shingles vaccine at [facility]  He also report having pneumonia vaccine  Due for flu vaccine  CCS: he would like to try cologuard        This is the Subsequent Medicare Annual Wellness Exam, performed 12 months or more after the Initial AWV or the last Subsequent AWV    I have reviewed the patient's medical history in detail and updated the computerized patient record. Assessment/Plan   Education and counseling provided:  Are appropriate based on today's review and evaluation      ICD-10-CM ICD-9-CM    1. Medicare annual wellness visit, subsequent  Z00.00 V70.0    2. Encounter for immunization  Z23 V03.89 INFLUENZA VIRUS VACCINE, HIGH DOSE SEASONAL, PRESERVATIVE FREE   3. Pain in both wrists  M25.531 719.43 REFERRAL TO ORTHOPEDICS    M25.532     4. Arthritis of wrist  M19.039 716.93    5. Sebaceous cyst  L72.3 706.2    6. Glucose intolerance (impaired glucose tolerance)  R73.02 790.22    7. Hyperlipidemia with target LDL less than 130  E78.5 272.4 CBC W/O DIFF      LIPID PANEL      METABOLIC PANEL, COMPREHENSIVE      simvastatin (ZOCOR) 40 mg tablet   8. Screening for alcoholism  Z13.39 V79.1 MS ANNUAL ALCOHOL SCREEN 15 MIN   9. Screening for depression  Z13.31 V79.0 Richi Hay   10.  Prediabetes  R73.03 790.29 CBC W/O DIFF      HEMOGLOBIN A1C WITH EAG      URINALYSIS W/ RFLX MICROSCOPIC      TSH 3RD GENERATION   11. Colon cancer screening  Z12.11 V76.51 COLOGUARD TEST (FECAL DNA COLORECTAL CANCER SCREENING)     Medicare Wellness Exam: Reviewed and addressed patient's medical history and concerns as discussed in note. Reviewed recommended screenings and immunizations. Discussed recommendations for diet, exercise, and lifestyle. Depression Risk Factor Screening     3 most recent PHQ Screens 11/2/2021   Little interest or pleasure in doing things Not at all   Feeling down, depressed, irritable, or hopeless Not at all   Total Score PHQ 2 0       Alcohol Risk Screen    Do you average more than 1 drink per night or more than 7 drinks a week: No    In the past three months have you have had more than 4 drinks containing alcohol on one occasion: No        Functional Ability and Level of Safety    Hearing: Hearing is good. Activities of Daily Living: The home contains: handrails and grab bars  Patient does total self care      Ambulation: with no difficulty     Fall Risk:  Fall Risk Assessment, last 12 mths 8/27/2020   Able to walk? Yes   Fall in past 12 months? No   Number of falls in past 12 months -   Fall with injury?  -      Abuse Screen:  Patient is not abused       Cognitive Screening    Has your family/caregiver stated any concerns about your memory: no     Cognitive Screening: Normal - Clock Drawing Test    Health Maintenance Due     Health Maintenance Due   Topic Date Due    COVID-19 Vaccine (1) Never done    Shingrix Vaccine Age 50> (1 of 2) Never done    Colorectal Cancer Screening Combo  09/03/2020    Lipid Screen  08/27/2021       Patient Care Team   Patient Care Team:  Ye Abarca MD as PCP - General (Family Medicine)    History     Patient Active Problem List   Diagnosis Code    Glucose intolerance (impaired glucose tolerance) R73.02    Hyperlipidemia with target LDL less than 130 E78.5    Inflammation of eyelid H01.9    Chalazion H00.19    Open-angle glaucoma H40.10X0    Posterior vitreous detachment H43.819    Primary open angle glaucoma H40.1190    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 9) E66.9    BPH without urinary obstruction N40.0    Sebaceous cyst L72.3    Erectile dysfunction N52.9     Past Medical History:   Diagnosis Date    Arthritis of wrist 5/26/2017    Basal cell carcinoma     Hyperlipidemia LDL goal < 130 3/6/2015      Past Surgical History:   Procedure Laterality Date    HX HEENT      basal cell carcinoma     Current Outpatient Medications   Medication Sig Dispense Refill    simvastatin (ZOCOR) 40 mg tablet Take 1 Tablet by mouth nightly. 90 Tablet 3    saw palmetto xtr/zinc picolin (SAW PALMETTO EXTRACT PO) Take  by mouth.  MV,MINERALS/FA/LYCOPENE/GINKGO (ONE-A-DAY MEN'S 50+ ADVANTAGE PO) Take  by mouth.  Cholecalciferol, Vitamin D3, (VITAMIN D3) 1,000 unit cap Take  by mouth.  VITAMIN B COMPLEX & VIT C NO.3 (VITAMIN B COMP AND C NO.3 PO) Take 1 tablet by mouth daily.  OMEGA-3 FATTY ACIDS (FISH OIL CONCENTRATE PO) Take 2,000 mg by mouth daily.  GINKGO BILOBA (GINKOBA PO) Take 120 mg by mouth daily.        Allergies   Allergen Reactions    Lactose Nausea and Vomiting     GI upset     Lumigan [Bimatoprost] Hives     Eye lesions     Other Medication Hives     Sunscreen     Sunscreen Hives    Xalatan [Latanoprost] Hives       Family History   Problem Relation Age of Onset    No Known Problems Mother     No Known Problems Father     Stroke Brother         (not blood relative)     Social History     Tobacco Use    Smoking status: Former Smoker     Packs/day: 0.25     Types: Cigarettes    Smokeless tobacco: Never Used    Tobacco comment: hardly smoked   Substance Use Topics    Alcohol use: No         Silvia Bob MD

## 2021-11-02 NOTE — PATIENT INSTRUCTIONS
Medicare Wellness Visit, Male    The best way to live healthy is to have a lifestyle where you eat a well-balanced diet, exercise regularly, limit alcohol use, and quit all forms of tobacco/nicotine, if applicable. Regular preventive services are another way to keep healthy. Preventive services (vaccines, screening tests, monitoring & exams) can help personalize your care plan, which helps you manage your own care. Screening tests can find health problems at the earliest stages, when they are easiest to treat. [facility] follows the current, evidence-based guidelines published by the [facility] Michel Kameron ([facility]) when recommending preventive services for our patients. Because we follow these guidelines, sometimes recommendations change over time as research supports it. (For example, a prostate screening blood test is no longer routinely recommended for men with no symptoms). Of course, you and your doctor may decide to screen more often for some diseases, based on your risk and co-morbidities (chronic disease you are already diagnosed with). Preventive services for you include:  - Medicare offers their members a free annual wellness visit, which is time for you and your primary care provider to discuss and plan for your preventive service needs. Take advantage of this benefit every year!  -All adults over age 72 should receive the recommended pneumonia vaccines. Current USPSTF guidelines recommend a series of two vaccines for the best pneumonia protection.   -All adults should have a flu vaccine yearly and tetanus vaccine every 10 years.  -All adults age 48 and older should receive the shingles vaccines (series of two vaccines).        -All adults age 38-68 who are overweight should have a diabetes screening test once every three years.   -Other screening tests & preventive services for persons with diabetes include: an eye exam to screen for diabetic retinopathy, a kidney function test, a foot exam, and stricter control over your cholesterol.   -Cardiovascular screening for adults with routine risk involves an electrocardiogram (ECG) at intervals determined by the provider.   -Colorectal cancer screening should be done for adults age 54-65 with no increased risk factors for colorectal cancer. There are a number of acceptable methods of screening for this type of cancer. Each test has its own benefits and drawbacks. Discuss with your provider what is most appropriate for you during your annual wellness visit. The different tests include: colonoscopy (considered the best screening method), a fecal occult blood test, a fecal DNA test, and sigmoidoscopy.  -All adults born between [facility] should be screened once for Hepatitis C.  -An Abdominal Aortic Aneurysm (AAA) Screening is recommended for men age 73-68 who has ever smoked in their lifetime.      Here is a list of your current Health Maintenance items (your personalized list of preventive services) with a due date:  Health Maintenance Due   Topic Date Due    COVID-19 Vaccine (1) Never done    Shingles Vaccine (1 of 2) Never done    Colorectal Screening  09/03/2020    Cholesterol Test   08/27/2021

## 2021-11-02 NOTE — PROGRESS NOTES
Chief Complaint   Patient presents with    Complete Physical        1. \"Have you been to the ER, urgent care clinic since your last visit? Hospitalized since your last visit? \" No    2. \"Have you seen or consulted any other health care providers outside of the [address] [place] [address] since your last visit? \" No     3. For patients aged 39-70: Has the patient had a colonoscopy? No     If the patient is female:    4. For patients aged 41-77: Has the patient had a mammogram within the past 2 years? No    5. For patients aged 21-30: Has the patient had a pap smear?  No    3 most recent PHQ Screens 8/27/2020   Little interest or pleasure in doing things Not at all   Feeling down, depressed, irritable, or hopeless Not at all   Total Score PHQ 2 0   \

## 2022-01-27 NOTE — ADDENDUM NOTE
Addended by: Nithin Littlejohn on: 11/2/2021 11:33 AM     Modules accepted: Mark Detail Level: Detailed Depth Of Biopsy: dermis Was A Bandage Applied: Yes Size Of Lesion In Cm: 0 Biopsy Type: H and E Biopsy Method: sterile single edge surgical blade Anesthesia Type: 1% lidocaine without epinephrine Hemostasis: Electrocautery Wound Care: Petrolatum Dressing: bandage Destruction After The Procedure: No Type Of Destruction Used: Electrodesiccation and Curettage Curettage Text: The wound bed was treated with curettage after the biopsy was performed. Cryotherapy Text: The wound bed was treated with cryotherapy after the biopsy was performed. Electrodesiccation Text: The wound bed was treated with electrodesiccation after the biopsy was performed. Electrodesiccation And Curettage Text: The wound bed was treated with electrodesiccation and curettage after the biopsy was performed. Silver Nitrate Text: The wound bed was treated with silver nitrate after the biopsy was performed. Lab: [id number] Lab Facility: [id number] Consent: Written consent was obtained and risks were reviewed including but not limited to scarring, infection, bleeding, scabbing, incomplete removal, nerve damage and allergy to anesthesia. Post-Care Instructions: I reviewed with the patient in detail post-care instructions. Patient is to keep the biopsy site dry for 24 hours, and then apply petroleum jelly daily until healed. Notification Instructions: Patient will be notified of biopsy results. However, patient instructed to call the office if not contacted within 2 weeks. Billing Type: Third-Party Bill Information: Selecting Yes will display possible errors in your note based on the variables you have selected. This validation is only offered as a suggestion for you. PLEASE NOTE THAT THE VALIDATION TEXT WILL BE REMOVED WHEN YOU FINALIZE YOUR NOTE. IF YOU WANT TO FAX A PRELIMINARY NOTE YOU WILL NEED TO TOGGLE THIS TO 'NO' IF YOU DO NOT WANT IT IN YOUR FAXED NOTE.

## 2022-02-14 ENCOUNTER — TELEPHONE (OUTPATIENT)
Dept: FAMILY MEDICINE CLINIC | Age: 72
End: 2022-02-14

## 2022-02-14 DIAGNOSIS — R19.5 POSITIVE COLORECTAL CANCER SCREENING USING COLOGUARD TEST: Primary | ICD-10-CM

## 2022-02-14 NOTE — TELEPHONE ENCOUNTER
Received positive cologuard report for patient. Attempted to call patient to discuss results, but no answer. Please call patient to tell him that his result is positive. Please let patient know that I would like for him to to follow up with GI for further evaluation. An order for this has been placed. Please provide patient with referral information to schedule appointment.      Shreya Vaughn MD

## 2022-02-14 NOTE — TELEPHONE ENCOUNTER
Called patient and discussed positive cologuard result. Provided referral to GI, and provided phone number to schedule apt. Patient agrees to call to schedule apt.      Sheri Fair MD

## 2022-02-18 ENCOUNTER — TELEPHONE (OUTPATIENT)
Dept: FAMILY MEDICINE CLINIC | Age: 72
End: 2022-02-18

## 2022-02-18 NOTE — TELEPHONE ENCOUNTER
----- Message from Tio Lofton sent at 2/14/2022  5:27 PM EST -----  Subject: Message to Provider    QUESTIONS  Information for Provider? Patient requesting a call back from the   provider. Patient did try to return the call but the office was closed. ---------------------------------------------------------------------------  --------------  Dacia MCKEON  What is the best way for the office to contact you? OK to leave message on   voicemail  Preferred Call Back Phone Number?  [phone]  ---------------------------------------------------------------------------  --------------  SCRIPT ANSWERS  undefined

## 2022-03-18 PROBLEM — N52.9 ERECTILE DYSFUNCTION: Status: ACTIVE | Noted: 2019-06-28

## 2022-03-18 PROBLEM — E66.811 OBESITY (BMI 30.0-34.9): Status: ACTIVE | Noted: 2019-06-28

## 2022-03-18 PROBLEM — E66.9 OBESITY (BMI 30.0-34.9): Status: ACTIVE | Noted: 2019-06-28

## 2022-03-18 PROBLEM — M19.039 ARTHRITIS OF WRIST: Status: ACTIVE | Noted: 2017-05-26

## 2022-03-19 PROBLEM — L72.3 SEBACEOUS CYST: Status: ACTIVE | Noted: 2019-06-28

## 2022-03-20 PROBLEM — N40.0 BPH WITHOUT URINARY OBSTRUCTION: Status: ACTIVE | Noted: 2019-06-28

## 2022-11-15 ENCOUNTER — OFFICE VISIT (OUTPATIENT)
Dept: FAMILY MEDICINE CLINIC | Age: 72
End: 2022-11-15
Payer: MEDICARE

## 2022-11-15 ENCOUNTER — TELEPHONE (OUTPATIENT)
Dept: FAMILY MEDICINE CLINIC | Age: 72
End: 2022-11-15

## 2022-11-15 VITALS
HEIGHT: 68 IN | HEART RATE: 73 BPM | TEMPERATURE: 97.8 F | SYSTOLIC BLOOD PRESSURE: 122 MMHG | BODY MASS INDEX: 32.92 KG/M2 | WEIGHT: 217.2 LBS | RESPIRATION RATE: 16 BRPM | DIASTOLIC BLOOD PRESSURE: 78 MMHG | OXYGEN SATURATION: 96 %

## 2022-11-15 DIAGNOSIS — Z12.5 SCREENING FOR MALIGNANT NEOPLASM OF PROSTATE: ICD-10-CM

## 2022-11-15 DIAGNOSIS — R73.03 PREDIABETES: ICD-10-CM

## 2022-11-15 DIAGNOSIS — E78.5 HYPERLIPIDEMIA WITH TARGET LDL LESS THAN 130: ICD-10-CM

## 2022-11-15 DIAGNOSIS — Z00.00 MEDICARE ANNUAL WELLNESS VISIT, SUBSEQUENT: ICD-10-CM

## 2022-11-15 DIAGNOSIS — Z85.828 HISTORY OF BASAL CELL CARCINOMA: ICD-10-CM

## 2022-11-15 DIAGNOSIS — Z23 ENCOUNTER FOR IMMUNIZATION: Primary | ICD-10-CM

## 2022-11-15 LAB
ALBUMIN SERPL-MCNC: 4.1 G/DL (ref 3.5–5)
ALBUMIN/GLOB SERPL: 1.3 {RATIO} (ref 1.1–2.2)
ALP SERPL-CCNC: 72 U/L (ref 45–117)
ALT SERPL-CCNC: 49 U/L (ref 12–78)
AMORPH CRY URNS QL MICRO: ABNORMAL
ANION GAP SERPL CALC-SCNC: 6 MMOL/L (ref 5–15)
APPEARANCE UR: ABNORMAL
AST SERPL-CCNC: 28 U/L (ref 15–37)
BACTERIA URNS QL MICRO: NEGATIVE /HPF
BILIRUB SERPL-MCNC: 0.6 MG/DL (ref 0.2–1)
BILIRUB UR QL: NEGATIVE
BUN SERPL-MCNC: 13 MG/DL (ref 6–20)
BUN/CREAT SERPL: 14 (ref 12–20)
CALCIUM SERPL-MCNC: 9 MG/DL (ref 8.5–10.1)
CAOX CRY URNS QL MICRO: ABNORMAL
CHLORIDE SERPL-SCNC: 104 MMOL/L (ref 97–108)
CHOLEST SERPL-MCNC: 192 MG/DL
CO2 SERPL-SCNC: 29 MMOL/L (ref 21–32)
COLOR UR: ABNORMAL
CREAT SERPL-MCNC: 0.96 MG/DL (ref 0.7–1.3)
EPITH CASTS URNS QL MICRO: ABNORMAL /LPF
ERYTHROCYTE [DISTWIDTH] IN BLOOD BY AUTOMATED COUNT: 14.6 % (ref 11.5–14.5)
EST. AVERAGE GLUCOSE BLD GHB EST-MCNC: 123 MG/DL
GLOBULIN SER CALC-MCNC: 3.2 G/DL (ref 2–4)
GLUCOSE SERPL-MCNC: 93 MG/DL (ref 65–100)
GLUCOSE UR STRIP.AUTO-MCNC: NEGATIVE MG/DL
HBA1C MFR BLD: 5.9 % (ref 4–5.6)
HCT VFR BLD AUTO: 52.8 % (ref 36.6–50.3)
HDLC SERPL-MCNC: 48 MG/DL
HDLC SERPL: 4 {RATIO} (ref 0–5)
HGB BLD-MCNC: 17.2 G/DL (ref 12.1–17)
HGB UR QL STRIP: NEGATIVE
KETONES UR QL STRIP.AUTO: ABNORMAL MG/DL
LDLC SERPL CALC-MCNC: 102.6 MG/DL (ref 0–100)
LEUKOCYTE ESTERASE UR QL STRIP.AUTO: NEGATIVE
MCH RBC QN AUTO: 32.6 PG (ref 26–34)
MCHC RBC AUTO-ENTMCNC: 32.6 G/DL (ref 30–36.5)
MCV RBC AUTO: 100.2 FL (ref 80–99)
NITRITE UR QL STRIP.AUTO: NEGATIVE
NRBC # BLD: 0 K/UL (ref 0–0.01)
NRBC BLD-RTO: 0 PER 100 WBC
PH UR STRIP: 5.5 [PH] (ref 5–8)
PLATELET # BLD AUTO: 308 K/UL (ref 150–400)
PMV BLD AUTO: 10.3 FL (ref 8.9–12.9)
POTASSIUM SERPL-SCNC: 5 MMOL/L (ref 3.5–5.1)
PROT SERPL-MCNC: 7.3 G/DL (ref 6.4–8.2)
PROT UR STRIP-MCNC: ABNORMAL MG/DL
RBC # BLD AUTO: 5.27 M/UL (ref 4.1–5.7)
RBC #/AREA URNS HPF: ABNORMAL /HPF (ref 0–5)
SODIUM SERPL-SCNC: 139 MMOL/L (ref 136–145)
SP GR UR REFRACTOMETRY: 1.02 (ref 1–1.03)
TRIGL SERPL-MCNC: 207 MG/DL (ref ?–150)
TSH SERPL DL<=0.05 MIU/L-ACNC: 1.49 UIU/ML (ref 0.36–3.74)
UROBILINOGEN UR QL STRIP.AUTO: 0.2 EU/DL (ref 0.2–1)
VLDLC SERPL CALC-MCNC: 41.4 MG/DL
WBC # BLD AUTO: 11.5 K/UL (ref 4.1–11.1)
WBC URNS QL MICRO: ABNORMAL /HPF (ref 0–4)

## 2022-11-15 PROCEDURE — 3017F COLORECTAL CA SCREEN DOC REV: CPT | Performed by: FAMILY MEDICINE

## 2022-11-15 PROCEDURE — 90694 VACC AIIV4 NO PRSRV 0.5ML IM: CPT | Performed by: FAMILY MEDICINE

## 2022-11-15 PROCEDURE — G8510 SCR DEP NEG, NO PLAN REQD: HCPCS | Performed by: FAMILY MEDICINE

## 2022-11-15 PROCEDURE — 1123F ACP DISCUSS/DSCN MKR DOCD: CPT | Performed by: FAMILY MEDICINE

## 2022-11-15 PROCEDURE — G8417 CALC BMI ABV UP PARAM F/U: HCPCS | Performed by: FAMILY MEDICINE

## 2022-11-15 PROCEDURE — G8427 DOCREV CUR MEDS BY ELIG CLIN: HCPCS | Performed by: FAMILY MEDICINE

## 2022-11-15 PROCEDURE — 1101F PT FALLS ASSESS-DOCD LE1/YR: CPT | Performed by: FAMILY MEDICINE

## 2022-11-15 PROCEDURE — G8536 NO DOC ELDER MAL SCRN: HCPCS | Performed by: FAMILY MEDICINE

## 2022-11-15 PROCEDURE — G0439 PPPS, SUBSEQ VISIT: HCPCS | Performed by: FAMILY MEDICINE

## 2022-11-15 RX ORDER — SIMVASTATIN 40 MG/1
40 TABLET, FILM COATED ORAL
Qty: 90 TABLET | Refills: 3 | Status: SHIPPED | OUTPATIENT
Start: 2022-11-15

## 2022-11-15 NOTE — PROGRESS NOTES
Chief Complaint   Patient presents with    Annual Wellness Visit       1. \"Have you been to the ER, urgent care clinic since your last visit? Hospitalized since your last visit? \" No    2. \"Have you seen or consulted any other health care providers outside of the [address] [place] [address] since your last visit? \" No     3. For patients aged 39-70: Has the patient had a colonoscopy / FIT/ Cologuard? Yes - no Care Gap present      If the patient is female:    4. For patients aged 41-77: Has the patient had a mammogram within the past 2 years? NA - based on age or sex      11. For patients aged 21-65: Has the patient had a pap smear? NA - based on age or sex         3 most recent PHQ Screens 11/15/2022   Little interest or pleasure in doing things Not at all   Feeling down, depressed, irritable, or hopeless Not at all   Total Score PHQ 2 0   Trouble falling or staying asleep, or sleeping too much Not at all   Feeling tired or having little energy Not at all   Poor appetite, weight loss, or overeating Not at all   Feeling bad about yourself - or that you are a failure or have let yourself or your family down Not at all   Trouble concentrating on things such as school, work, reading, or watching TV Not at all   Moving or speaking so slowly that other people could have noticed; or the opposite being so fidgety that others notice Not at all   Thoughts of being better off dead, or hurting yourself in some way Not at all   PHQ 9 Score 0   How difficult have these problems made it for you to do your work, take care of your home and get along with others Not difficult at all       Abuse Screening Questionnaire 11/15/2022   Do you ever feel afraid of your partner? N   Are you in a relationship with someone who physically or mentally threatens you? N   Is it safe for you to go home?  Y       ADL Assessment 8/27/2020   Feeding yourself No Help Needed   Getting from bed to chair No Help Needed   Getting dressed No Help Needed Bathing or showering No Help Needed   Walk across the room (includes cane/walker) No Help Needed   Using the telphone No Help Needed   Taking your medications No Help Needed   Preparing meals No Help Needed   Managing money (expenses/bills) No Help Needed   Moderately strenuous housework (laundry) No Help Needed   Shopping for personal items (toiletries/medicines) No Help Needed   Shopping for groceries No Help Needed   Driving No Help Needed   Climbing a flight of stairs No Help Needed   Getting to places beyond walking distances No Help Needed       Fall Risk Assessment, last 12 mths 11/15/2022   Able to walk? Yes   Fall in past 12 months? 0   Do you feel unsteady?  0   Are you worried about falling 0   Number of falls in past 12 months -   Fall with injury? -         Health Maintenance Due   Topic Date Due    COVID-19 Vaccine (1) Never done    Shingrix Vaccine Age 50> (1 of 2) Never done    Flu Vaccine (1) 08/01/2022    Depression Screen  11/02/2022    Lipid Screen  11/02/2022    Medicare Yearly Exam  11/03/2022

## 2022-11-15 NOTE — TELEPHONE ENCOUNTER
Chief Complaint   Patient presents with    Results     Colonoscopy      Faxed electronically over dr. Jazmín Branch office. Confirmation was received.

## 2022-11-15 NOTE — LETTER
11/15/2022 1:46 PM        Dear Dr. Juan Antonio Sr,    Please fax us the most recent colonoscopy results note so that we may update the patient's records for continuity of care.      Our fax number: [phone]    Patient:   Nighat Cabello  1950                      Sincerely,      Yamel Ocapmo MD

## 2022-11-15 NOTE — TELEPHONE ENCOUNTER
----- Message from Shannon Alexis MD sent at 11/15/2022  9:16 AM EST -----  Regarding: colonoscopy report  Please obtain last colonoscopy report from Dr. Yaritza Mirza for this patient.

## 2022-11-15 NOTE — PATIENT INSTRUCTIONS
Medicare Wellness Visit, Male    The best way to live healthy is to have a lifestyle where you eat a well-balanced diet, exercise regularly, limit alcohol use, and quit all forms of tobacco/nicotine, if applicable. Regular preventive services are another way to keep healthy. Preventive services (vaccines, screening tests, monitoring & exams) can help personalize your care plan, which helps you manage your own care. Screening tests can find health problems at the earliest stages, when they are easiest to treat. [facility] follows the current, evidence-based guidelines published by the [facility] ([facility]) when recommending preventive services for our patients. Because we follow these guidelines, sometimes recommendations change over time as research supports it. (For example, a prostate screening blood test is no longer routinely recommended for men with no symptoms). Of course, you and your doctor may decide to screen more often for some diseases, based on your risk and co-morbidities (chronic disease you are already diagnosed with). Preventive services for you include:  - Medicare offers their members a free annual wellness visit, which is time for you and your primary care provider to discuss and plan for your preventive service needs. Take advantage of this benefit every year!  -All adults over age 72 should receive the recommended pneumonia vaccines. Current USPSTF guidelines recommend a series of two vaccines for the best pneumonia protection.   -All adults should have a flu vaccine yearly and tetanus vaccine every 10 years.  -All adults age 48 and older should receive the shingles vaccines (series of two vaccines).        -All adults age 38-68 who are overweight should have a diabetes screening test once every three years.   -Other screening tests & preventive services for persons with diabetes include: an eye exam to screen for diabetic retinopathy, a kidney function test, a foot exam, and stricter control over your cholesterol.   -Cardiovascular screening for adults with routine risk involves an electrocardiogram (ECG) at intervals determined by the provider.   -Colorectal cancer screening should be done for adults age 54-65 with no increased risk factors for colorectal cancer. There are a number of acceptable methods of screening for this type of cancer. Each test has its own benefits and drawbacks. Discuss with your provider what is most appropriate for you during your annual wellness visit. The different tests include: colonoscopy (considered the best screening method), a fecal occult blood test, a fecal DNA test, and sigmoidoscopy.  -All adults born between [facility] should be screened once for Hepatitis C.  -An Abdominal Aortic Aneurysm (AAA) Screening is recommended for men age 73-68 who has ever smoked in their lifetime.      Here is a list of your current Health Maintenance items (your personalized list of preventive services) with a due date:  Health Maintenance Due   Topic Date Due    COVID-19 Vaccine (1) Never done    Shingles Vaccine (1 of 2) Never done    Depresssion Screening  11/02/2022    Cholesterol Test   11/02/2022

## 2022-11-15 NOTE — PROGRESS NOTES
This is the Subsequent Medicare Annual Wellness Exam, performed 12 months or more after the Initial AWV or the last Subsequent AWV    Positive Cologuard: patient reports having colonoscopy that showed one polyp, but was otherwise normal.  Wrist Pain: he reports as improved with Tumeric and cannibus  Glaucoma: he reports using cannibus daily; he is followed by [facility], but he reports that he stopped going d/t being allergic to the drops that they prescribed. He reports that his vision has been good recently. HLD: continues simvastatin  Prediabetes: last A1c was 5.8 on 11/2/21  Obesity: he continues to work on diet and exercise. Hx of BCC: he had excision with plastic surgery around 2003, but he denies any recurrence since. He reports that he is allergic to sunblock, so he has been wearing long sleeves. Immunizations  COVID Vaccine: he is due for booster; advised to get at pharmacy  Shingles Vaccine: He reports that he had 2/2 Shingles vaccine at [facility]  [facility]: [facility] Flu Vaccine: he is agreeable to getting this today      Screenings  Colon Cancer Screening: see HPI regarding positive cologuard  PSA Screening: he declines at this time      I have reviewed the patient's medical history in detail and updated the computerized patient record. Assessment/Plan   Education and counseling provided:  Are appropriate based on today's review and evaluation    Medicare Wellness Exam: Reviewed and addressed patient's medical history and concerns as discussed in note. Reviewed recommended screenings and immunizations. Discussed recommendations for diet, exercise, and lifestyle.       Depression Risk Factor Screening     3 most recent PHQ Screens 11/15/2022   Little interest or pleasure in doing things Not at all   Feeling down, depressed, irritable, or hopeless Not at all   Total Score PHQ 2 0   Trouble falling or staying asleep, or sleeping too much Not at all   Feeling tired or having little energy Not at all   Poor appetite, weight loss, or overeating Not at all   Feeling bad about yourself - or that you are a failure or have let yourself or your family down Not at all   Trouble concentrating on things such as school, work, reading, or watching TV Not at all   Moving or speaking so slowly that other people could have noticed; or the opposite being so fidgety that others notice Not at all   Thoughts of being better off dead, or hurting yourself in some way Not at all   PHQ 9 Score 0   How difficult have these problems made it for you to do your work, take care of your home and get along with others Not difficult at all       Alcohol & Drug Abuse Risk Screen    Do you average more than 1 drink per night or more than 7 drinks a week: No    In the past three months have you have had more than 4 drinks containing alcohol on one occasion: No          Functional Ability and Level of Safety    Hearing: Hearing is good. Activities of Daily Living: The home contains:  one level house  with handrails on front and back  Patient does total self care      Ambulation: with no difficulty     Fall Risk:  Fall Risk Assessment, last 12 mths 11/15/2022   Able to walk? Yes   Fall in past 12 months? 0   Do you feel unsteady? 0   Are you worried about falling 0   Number of falls in past 12 months -   Fall with injury?  -      Abuse Screen:  Patient is not abused       Cognitive Screening    Has your family/caregiver stated any concerns about your memory: no     Cognitive Screening: Normal - Clock Drawing Test    Health Maintenance Due     Health Maintenance Due   Topic Date Due    COVID-19 Vaccine (1) Never done    Shingrix Vaccine Age 50> (1 of 2) Never done    Depression Screen  11/02/2022    Lipid Screen  11/02/2022       Patient Care Team   Patient Care Team:  Katie Munoz MD as PCP - General (Family Medicine)  Ktaie Munoz MD as PCP - Yonathan Mattled Provider    History     Patient Active Problem List   Diagnosis Code    Glucose intolerance (impaired glucose tolerance) R73.02    Hyperlipidemia with target LDL less than 130 E78.5    Inflammation of eyelid H01.9    Chalazion H00.19    Open-angle glaucoma H40.10X0    Posterior vitreous detachment H43.819    Primary open angle glaucoma H40.1190    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 9) E66.9    BPH without urinary obstruction N40.0    Sebaceous cyst L72.3    Erectile dysfunction N52.9     Past Medical History:   Diagnosis Date    Arthritis of wrist 5/26/2017    Basal cell carcinoma     Hyperlipidemia LDL goal < 130 3/6/2015      Past Surgical History:   Procedure Laterality Date    HX HEENT      basal cell carcinoma     Current Outpatient Medications   Medication Sig Dispense Refill    simvastatin (ZOCOR) 40 mg tablet Take 1 Tablet by mouth nightly. 90 Tablet 3    saw palmetto xtr/zinc picolin (SAW PALMETTO EXTRACT PO) Take  by mouth. MV,MINERALS/FA/LYCOPENE/GINKGO (ONE-A-DAY MEN'S 50+ ADVANTAGE PO) Take  by mouth. cholecalciferol (VITAMIN D3) 25 mcg (1,000 unit) cap Take  by mouth. VITAMIN B COMPLEX & VIT C NO.3 (VITAMIN B COMP AND C NO.3 PO) Take 1 tablet by mouth daily. OMEGA-3 FATTY ACIDS (FISH OIL CONCENTRATE PO) Take 2,000 mg by mouth daily. GINKGO BILOBA (GINKOBA PO) Take 120 mg by mouth daily.        Allergies   Allergen Reactions    Lactose Nausea and Vomiting     GI upset     Lumigan [Bimatoprost] Hives     Eye lesions     Other Medication Hives     Sunscreen     Sunscreen Hives    Xalatan [Latanoprost] Hives       Family History   Problem Relation Age of Onset    No Known Problems Mother     No Known Problems Father     Stroke Brother         (not blood relative)     Social History     Tobacco Use    Smoking status: Former     Packs/day: 0.25     Types: Cigarettes    Smokeless tobacco: Never    Tobacco comments:     hardly smoked   Substance Use Topics    Alcohol use: No         Nyla Acuna MD

## 2022-12-29 DIAGNOSIS — R82.90 ABNORMAL URINALYSIS: ICD-10-CM

## 2022-12-29 DIAGNOSIS — D72.829 LEUKOCYTOSIS, UNSPECIFIED TYPE: Primary | ICD-10-CM

## 2022-12-29 NOTE — PROGRESS NOTES
Please let patient know that his labs show slightly elevated WBCs (immune fighting cells) and hemoglobin (oxygen carrying cells). I recommend rechecking this. He should ensure that he is plenty of water prior to having this drawn. His urine also showed findings that can sometimes be associated with kidney stones. I would like to repeat this as well. He can schedule a lab visit at his convenience.

## 2022-12-30 ENCOUNTER — TELEPHONE (OUTPATIENT)
Dept: FAMILY MEDICINE CLINIC | Age: 72
End: 2022-12-30

## 2022-12-30 NOTE — PROGRESS NOTES
Called Patient. Name and  confirmed. Informed him about his labs show slightly elevated WBCs (immune fighting cells) and hemoglobin (oxygen carrying cells). Dr. Pineda Hernandez recommends rechecking this. you should ensure that you have  plenty of water prior to having this drawn. urine also showed findings that can sometimes be associated with kidney stones. Dr. Luis Alberto Mckeon would like to repeat this as well. you can schedule a lab visit at his convenience. Patient stated understanding. Patient stated that I will call next week for scheduling lab appointment.

## 2022-12-30 NOTE — TELEPHONE ENCOUNTER
Patient got a call yesterday from the nurse while he did not have his phone.  He was hoping to get a call back before the day ends    Best call back number  [phone]

## 2023-01-23 ENCOUNTER — LAB ONLY (OUTPATIENT)
Dept: FAMILY MEDICINE CLINIC | Age: 73
End: 2023-01-23

## 2023-01-23 DIAGNOSIS — R82.90 ABNORMAL URINALYSIS: ICD-10-CM

## 2023-01-23 DIAGNOSIS — D72.829 LEUKOCYTOSIS, UNSPECIFIED TYPE: ICD-10-CM

## 2023-01-23 LAB
APPEARANCE UR: CLEAR
BACTERIA URNS QL MICRO: NEGATIVE /HPF
BILIRUB UR QL: NEGATIVE
COLOR UR: ABNORMAL
EPITH CASTS URNS QL MICRO: ABNORMAL /LPF
ERYTHROCYTE [DISTWIDTH] IN BLOOD BY AUTOMATED COUNT: 14.2 % (ref 11.5–14.5)
GLUCOSE UR STRIP.AUTO-MCNC: NEGATIVE MG/DL
HCT VFR BLD AUTO: 51.6 % (ref 36.6–50.3)
HGB BLD-MCNC: 16.6 G/DL (ref 12.1–17)
HGB UR QL STRIP: NEGATIVE
KETONES UR QL STRIP.AUTO: NEGATIVE MG/DL
LEUKOCYTE ESTERASE UR QL STRIP.AUTO: ABNORMAL
MCH RBC QN AUTO: 32 PG (ref 26–34)
MCHC RBC AUTO-ENTMCNC: 32.2 G/DL (ref 30–36.5)
MCV RBC AUTO: 99.4 FL (ref 80–99)
NITRITE UR QL STRIP.AUTO: NEGATIVE
NRBC # BLD: 0 K/UL (ref 0–0.01)
NRBC BLD-RTO: 0 PER 100 WBC
PH UR STRIP: 5.5 (ref 5–8)
PLATELET # BLD AUTO: 331 K/UL (ref 150–400)
PMV BLD AUTO: 10.3 FL (ref 8.9–12.9)
PROT UR STRIP-MCNC: NEGATIVE MG/DL
RBC # BLD AUTO: 5.19 M/UL (ref 4.1–5.7)
RBC #/AREA URNS HPF: ABNORMAL /HPF (ref 0–5)
SP GR UR REFRACTOMETRY: 1.02 (ref 1–1.03)
UROBILINOGEN UR QL STRIP.AUTO: 0.2 EU/DL (ref 0.2–1)
WBC # BLD AUTO: 8.5 K/UL (ref 4.1–11.1)
WBC URNS QL MICRO: ABNORMAL /HPF (ref 0–4)

## 2023-01-24 LAB
BACTERIA SPEC CULT: NORMAL
SERVICE CMNT-IMP: NORMAL

## 2023-01-24 NOTE — PROGRESS NOTES
Dear Samuel Gonsalves,    Your urine culture did not grow out any bacteria that would need to be treated with antibiotics. Three is no further sign of protein in your urine. Your WBCs (immune fighting cells) is back to normal. If you have any questions, please feel free to call our office at [phone].      Ramona Kang MD

## 2023-11-15 RX ORDER — SIMVASTATIN 40 MG
40 TABLET ORAL NIGHTLY
Qty: 30 TABLET | Refills: 0 | Status: SHIPPED | OUTPATIENT
Start: 2023-11-15 | End: 2024-01-18 | Stop reason: SDUPTHER

## 2023-11-15 NOTE — TELEPHONE ENCOUNTER
Patient called have appointment on 02/21/2024 with JENNIFER Woods requesting refill    simvastatin (ZOCOR) 40 MG tablet    Best call back #[phone]

## 2023-11-15 NOTE — TELEPHONE ENCOUNTER
Patient called have appointment on 02/21/2024 with NP Peggy requesting refill    simvastatin (ZOCOR) 40 MG tablet    Best call back #[phone]    Last appointment: 11/15/22 MD Page   Next appointment: 2/21/24 Peggy  Previous refill encounter(s): 11/15/22 90 + 3    Requested Prescriptions     Pending Prescriptions Disp Refills    simvastatin (ZOCOR) 40 MG tablet 90 tablet 0     Sig: Take 1 tablet by mouth nightly     For Pharmacy Admin Tracking Only    Program: Medication Refill  CPA in place:    Recommendation Provided To:   Intervention Detail: New Rx: 1, reason: Patient Preference  Intervention Accepted By:   Gap Closed?:    Time Spent (min): 5

## 2023-11-16 NOTE — TELEPHONE ENCOUNTER
[facility] office on 11.16.23 when pt calls back please inform him that JENNIFER Woods would like the pt to be seen  sooner than 3.1.24 @ 8:00 AM.JENNIFER Woods does have some availability in February.

## 2024-01-08 NOTE — TELEPHONE ENCOUNTER
Patient call.  Asking for refill til appt 3/1/23 as going out of the country but will be back for appt.  Thanks, Maru    Last appointment: 11/15/22 Camilo  Next appointment: 3/1/24 Peggy  Previous refill encounter(s): 11/15/23 30    Requested Prescriptions     Pending Prescriptions Disp Refills    simvastatin (ZOCOR) 40 MG tablet 30 tablet 0     Sig: Take 1 tablet by mouth nightly     For Pharmacy Admin Tracking Only    Program: Medication Refill  CPA in place:    Recommendation Provided To:   Intervention Detail: New Rx: 1, reason: Patient Preference  Intervention Accepted By:   Gap Closed?:    Time Spent (min): 5      Subject: Refill Request     QUESTIONS   Name of Medication? simvastatin (ZOCOR) 40 MG tablet   Patient-reported dosage and instructions? Take 1 tablet by mouth nightly   How many days do you have left? 3   Preferred Pharmacy? [id number]   Pharmacy phone number (if available)? [phone]   Additional Information for Provider? Patient needs a partial refill until   his appointment 3/1/2023, will be leaving the country soon will be back in   time for appointment.   ---------------------------------------------------------------------------   --------------   CALL BACK INFO   What is the best way for the office to contact you? OK to leave message on   voicemail   Preferred Call Back Phone Number? [phone]   ---------------------------------------------------------------------------   --------------   SCRIPT ANSWERS   Relationship to Patient? Self

## 2024-01-09 RX ORDER — SIMVASTATIN 40 MG
40 TABLET ORAL NIGHTLY
Qty: 30 TABLET | Refills: 0 | OUTPATIENT
Start: 2024-01-09

## 2024-01-10 NOTE — TELEPHONE ENCOUNTER
Informed pt on 1/10/24 that NP Peggy has refused his Zocor 40 mg due to not being seen in over as year.\"Pt then stated what am I supposed to do just quit abruptly?\" I did let him know that he could try [facility] to get a temporary refill.Pt refused UC stating that he doesn't have time to  go with going out of town, pt again stated that he need's a temporary supply that he could not get an appt until March.

## 2024-01-12 ENCOUNTER — TELEPHONE (OUTPATIENT)
Age: 74
End: 2024-01-12

## 2024-01-12 RX ORDER — SIMVASTATIN 40 MG
40 TABLET ORAL NIGHTLY
Qty: 60 TABLET | Refills: 0 | OUTPATIENT
Start: 2024-01-12

## 2024-01-12 NOTE — TELEPHONE ENCOUNTER
Patient is at the pharmacy wanting to know if he will be fine waiting until his new patient appt. With Kristal Woods on 3/1/24 without his medication being refilled. The medication is the Zocor 40 MG. He wants the nurse to give him a call.   Best call back number is: [phone]

## 2024-01-12 NOTE — TELEPHONE ENCOUNTER
Patient's wife is calling again/Using loud/upset voice stating that they have been trying to obtain a refill of patient's statin.  He took his last pill last night.    Noted this patient is previous MD Page patient and last office visit is 11/15/22. Labs 1/2023.    **This has been refused previously and it looks like was to be sent to the supervisor.**    Wife noted that they are going out of the country next week and will be gone for a month and needs this refilled ASAP.    Patient has OV scheduled/[facility] on 3/1/24.      Last appointment: 11/15/22 Camilo  Next appointment: 3/1/24 Peggy  Previous refill encounter(s): 11/15/23 30    Requested Prescriptions     Pending Prescriptions Disp Refills    simvastatin (ZOCOR) 40 MG tablet 60 tablet 0     Sig: Take 1 tablet by mouth nightly     For Pharmacy Admin Tracking Only    Program: Medication Refill  CPA in place:    Recommendation Provided To:   Intervention Detail: New Rx: 1, reason: Patient Preference  Intervention Accepted By:   Gap Closed?:    Time Spent (min): 10

## 2024-01-12 NOTE — TELEPHONE ENCOUNTER
Attempted to contact patient no answer.     Patient called back wanted to know if he will be okay if he stays off medication for an extended period of time.     Patient was advised to go to an urgent care since there is no appointments left for the day and he has not been seen in over a year he needs to get blood work done.     Patient was upset about outcome and stated he is not going to go to an urgent care.    Writer reiterated the importance of getting help at urgent care pending appointment at office.

## 2024-01-12 NOTE — TELEPHONE ENCOUNTER
Pt called in regard to previous notes. Pt sates \" I  just need to know if I am going to be okay without my med for a month.\"  Pt requested to speak with the supervisor.     BCB# [phone]

## 2024-01-17 NOTE — TELEPHONE ENCOUNTER
Contacted patient and scheduled first available appointment for him 1/18 to reestablish care and get medications filled.

## 2024-01-18 ENCOUNTER — OFFICE VISIT (OUTPATIENT)
Age: 74
End: 2024-01-18
Payer: MEDICARE

## 2024-01-18 VITALS
TEMPERATURE: 97.5 F | DIASTOLIC BLOOD PRESSURE: 81 MMHG | HEIGHT: 68 IN | RESPIRATION RATE: 16 BRPM | WEIGHT: 198 LBS | SYSTOLIC BLOOD PRESSURE: 133 MMHG | HEART RATE: 76 BPM | OXYGEN SATURATION: 97 % | BODY MASS INDEX: 30.01 KG/M2

## 2024-01-18 DIAGNOSIS — Z12.5 ENCOUNTER FOR PROSTATE CANCER SCREENING: ICD-10-CM

## 2024-01-18 DIAGNOSIS — R73.03 PREDIABETES: ICD-10-CM

## 2024-01-18 DIAGNOSIS — Z00.00 ENCOUNTER FOR GENERAL ADULT MEDICAL EXAMINATION WITHOUT ABNORMAL FINDINGS: Primary | ICD-10-CM

## 2024-01-18 DIAGNOSIS — E66.9 OBESITY (BMI 30.0-34.9): ICD-10-CM

## 2024-01-18 DIAGNOSIS — Z23 NEED FOR INFLUENZA VACCINATION: ICD-10-CM

## 2024-01-18 DIAGNOSIS — E78.5 HYPERLIPIDEMIA, UNSPECIFIED HYPERLIPIDEMIA TYPE: ICD-10-CM

## 2024-01-18 PROCEDURE — 99204 OFFICE O/P NEW MOD 45 MIN: CPT

## 2024-01-18 PROCEDURE — 90694 VACC AIIV4 NO PRSRV 0.5ML IM: CPT

## 2024-01-18 RX ORDER — SIMVASTATIN 40 MG
40 TABLET ORAL NIGHTLY
Qty: 30 TABLET | Refills: 0 | Status: SHIPPED | OUTPATIENT
Start: 2024-01-18

## 2024-01-18 SDOH — ECONOMIC STABILITY: FOOD INSECURITY: WITHIN THE PAST 12 MONTHS, YOU WORRIED THAT YOUR FOOD WOULD RUN OUT BEFORE YOU GOT MONEY TO BUY MORE.: NEVER TRUE

## 2024-01-18 SDOH — ECONOMIC STABILITY: INCOME INSECURITY: HOW HARD IS IT FOR YOU TO PAY FOR THE VERY BASICS LIKE FOOD, HOUSING, MEDICAL CARE, AND HEATING?: NOT HARD AT ALL

## 2024-01-18 SDOH — ECONOMIC STABILITY: HOUSING INSECURITY
IN THE LAST 12 MONTHS, WAS THERE A TIME WHEN YOU DID NOT HAVE A STEADY PLACE TO SLEEP OR SLEPT IN A SHELTER (INCLUDING NOW)?: NO

## 2024-01-18 SDOH — ECONOMIC STABILITY: FOOD INSECURITY: WITHIN THE PAST 12 MONTHS, THE FOOD YOU BOUGHT JUST DIDN'T LAST AND YOU DIDN'T HAVE MONEY TO GET MORE.: NEVER TRUE

## 2024-01-18 ASSESSMENT — ENCOUNTER SYMPTOMS
COUGH: 0
SHORTNESS OF BREATH: 0
VOMITING: 0
CONSTIPATION: 0
ABDOMINAL PAIN: 0
DIARRHEA: 0
BLOOD IN STOOL: 0
NAUSEA: 0

## 2024-01-18 ASSESSMENT — PATIENT HEALTH QUESTIONNAIRE - PHQ9
SUM OF ALL RESPONSES TO PHQ QUESTIONS 1-9: 0
2. FEELING DOWN, DEPRESSED OR HOPELESS: 0
1. LITTLE INTEREST OR PLEASURE IN DOING THINGS: 0
SUM OF ALL RESPONSES TO PHQ9 QUESTIONS 1 & 2: 0
SUM OF ALL RESPONSES TO PHQ QUESTIONS 1-9: 0

## 2024-01-18 NOTE — PROGRESS NOTES
Chief Complaint   Patient presents with    Established New Doctor     1. \"Have you been to the ER, urgent care clinic since your last visit?  Hospitalized since your last visit?\" no    2. \"Have you seen or consulted any other health care providers outside of the [facility] since your last visit?\"  no

## 2024-01-18 NOTE — PROGRESS NOTES
Constantino Loyd is a 73 y.o. year old male who is a new patient to me today (01/18/24).  He was previous followed by Dr. Page, last seen November 2022 .  He has a history of hyperlipidemia, takes atorvastatin 40mg daily. He is here to establish care. Has no other complaints. He is going out of the country tomorrow to [facility]. He wants to get his flu shot.     Health maintenance:  Flu vaccine, will receive today  COVID vaccine, up to date   Colonoscopy, done 07/12/2022  PSA, will due today   RSV, declines at this time     Assessment & Plan:   Below is the assessment and plan developed based on review of pertinent history, physical exam, labs, studies, and medications.    1. Encounter for general adult medical examination without abnormal findings  - Lipid Panel; Future  - Comprehensive Metabolic Panel; Future  - Hemoglobin A1C; Future  - CBC; Future    2. Hyperlipidemia, unspecified hyperlipidemia type  - Lipid Panel; Future  - simvastatin (ZOCOR) 40 MG tablet; Take 1 tablet by mouth nightly  Dispense: 30 tablet; Refill: 0    3. Prediabetes  - Hemoglobin A1C; Future    4. Obesity (BMI 30.0-34.9)  -has lost 20lbs since last visit   - Comprehensive Metabolic Panel; Future    5. Encounter for prostate cancer screening  - PSA Screening; Future    6. Need for influenza vaccination  - Influenza, FLUAD, (age 65 y+), IM, Preservative Free, 0.5 mL     Return in about 6 months (around 7/18/2024).   Subjective/Objective:   Constantino was seen today for   Chief Complaint   Patient presents with    Established New Doctor        The following sections were reviewed & updated as appropriate: Problem List, Allergies, PMH, PSH, FH, and SH.    Prior to Admission medications    Medication Sig Start Date End Date Taking? Authorizing Provider   simvastatin (ZOCOR) 40 MG tablet Take 1 tablet by mouth nightly 11/15/23  Yes Kristal Woods, APRN - NP   vitamin D 25 MCG (1000 UT) CAPS Take by mouth   Yes Automatic Reconciliation, Ar

## 2024-01-19 LAB
ALBUMIN SERPL-MCNC: 4.1 G/DL (ref 3.5–5)
ALBUMIN/GLOB SERPL: 1.2 (ref 1.1–2.2)
ALP SERPL-CCNC: 77 U/L (ref 45–117)
ALT SERPL-CCNC: 38 U/L (ref 12–78)
ANION GAP SERPL CALC-SCNC: 5 MMOL/L (ref 5–15)
AST SERPL-CCNC: 27 U/L (ref 15–37)
BILIRUB SERPL-MCNC: 0.6 MG/DL (ref 0.2–1)
BUN SERPL-MCNC: 12 MG/DL (ref 6–20)
BUN/CREAT SERPL: 13 (ref 12–20)
CALCIUM SERPL-MCNC: 9.3 MG/DL (ref 8.5–10.1)
CHLORIDE SERPL-SCNC: 101 MMOL/L (ref 97–108)
CHOLEST SERPL-MCNC: 247 MG/DL
CO2 SERPL-SCNC: 30 MMOL/L (ref 21–32)
CREAT SERPL-MCNC: 0.91 MG/DL (ref 0.7–1.3)
ERYTHROCYTE [DISTWIDTH] IN BLOOD BY AUTOMATED COUNT: 14.3 % (ref 11.5–14.5)
EST. AVERAGE GLUCOSE BLD GHB EST-MCNC: 120 MG/DL
GLOBULIN SER CALC-MCNC: 3.4 G/DL (ref 2–4)
GLUCOSE SERPL-MCNC: 88 MG/DL (ref 65–100)
HBA1C MFR BLD: 5.8 % (ref 4–5.6)
HCT VFR BLD AUTO: 54.5 % (ref 36.6–50.3)
HDLC SERPL-MCNC: 52 MG/DL
HDLC SERPL: 4.8 (ref 0–5)
HGB BLD-MCNC: 17.7 G/DL (ref 12.1–17)
LDLC SERPL CALC-MCNC: 164.4 MG/DL (ref 0–100)
MCH RBC QN AUTO: 32.2 PG (ref 26–34)
MCHC RBC AUTO-ENTMCNC: 32.5 G/DL (ref 30–36.5)
MCV RBC AUTO: 99.1 FL (ref 80–99)
NRBC # BLD: 0 K/UL (ref 0–0.01)
NRBC BLD-RTO: 0 PER 100 WBC
PLATELET # BLD AUTO: 321 K/UL (ref 150–400)
PMV BLD AUTO: 10.6 FL (ref 8.9–12.9)
POTASSIUM SERPL-SCNC: 4.6 MMOL/L (ref 3.5–5.1)
PROT SERPL-MCNC: 7.5 G/DL (ref 6.4–8.2)
PSA SERPL-MCNC: 2.2 NG/ML (ref 0.01–4)
RBC # BLD AUTO: 5.5 M/UL (ref 4.1–5.7)
SODIUM SERPL-SCNC: 136 MMOL/L (ref 136–145)
TRIGL SERPL-MCNC: 153 MG/DL
VLDLC SERPL CALC-MCNC: 30.6 MG/DL
WBC # BLD AUTO: 8.3 K/UL (ref 4.1–11.1)

## 2024-02-26 DIAGNOSIS — E78.5 HYPERLIPIDEMIA, UNSPECIFIED HYPERLIPIDEMIA TYPE: ICD-10-CM

## 2024-02-26 RX ORDER — SIMVASTATIN 40 MG
40 TABLET ORAL NIGHTLY
Qty: 30 TABLET | Refills: 0 | Status: SHIPPED | OUTPATIENT
Start: 2024-02-26

## 2024-02-26 NOTE — TELEPHONE ENCOUNTER
----- Message from Amalia Gomez sent at 2/23/2024  4:24 PM EST -----  Subject: Refill Request    QUESTIONS  Name of Medication? simvastatin (ZOCOR) 40 MG tablet  Patient-reported dosage and instructions? 1 a day   How many days do you have left? 0  Preferred Pharmacy? [id number]  Pharmacy phone number (if available)? [phone]  ---------------------------------------------------------------------------  --------------  CALL BACK INFO  What is the best way for the office to contact you? Do not leave any   message, patient will call back for answer  Preferred Call Back Phone Number? [phone]  ---------------------------------------------------------------------------  --------------  SCRIPT ANSWERS  Relationship to Patient? Self

## 2024-02-26 NOTE — TELEPHONE ENCOUNTER
PCP: Alexandre Page MD      Future Appointments   Date Time Provider Department Center   5/22/2024  8:15 AM LAB ONLY [facility]       Requested Prescriptions     Pending Prescriptions Disp Refills    simvastatin (ZOCOR) 40 MG tablet [Pharmacy Med Name: Simvastatin 40 MG Oral Tablet] 30 tablet 0     Sig: Take 1 tablet by mouth nightly     LOV: 1/18/24  NOV: Not made

## 2024-04-02 DIAGNOSIS — E78.5 HYPERLIPIDEMIA, UNSPECIFIED HYPERLIPIDEMIA TYPE: ICD-10-CM

## 2024-04-02 NOTE — TELEPHONE ENCOUNTER
PCP: Alie Leach APRN - CNP      Future Appointments   Date Time Provider Department Center   5/22/2024  8:15 AM LAB ONLY [facility]       Requested Prescriptions     Pending Prescriptions Disp Refills    simvastatin (ZOCOR) 40 MG tablet 30 tablet 0     Sig: Take 1 tablet by mouth nightly     LOV: 1/18/24  NOV: Not Made    Requesting 90 day refill

## 2024-04-03 DIAGNOSIS — E78.5 HYPERLIPIDEMIA, UNSPECIFIED HYPERLIPIDEMIA TYPE: ICD-10-CM

## 2024-04-03 RX ORDER — SIMVASTATIN 40 MG
40 TABLET ORAL NIGHTLY
Qty: 30 TABLET | Refills: 0 | OUTPATIENT
Start: 2024-04-03

## 2024-04-03 RX ORDER — SIMVASTATIN 40 MG
40 TABLET ORAL NIGHTLY
Qty: 90 TABLET | Refills: 0 | Status: SHIPPED | OUTPATIENT
Start: 2024-04-03

## 2024-05-22 ENCOUNTER — NURSE ONLY (OUTPATIENT)
Age: 74
End: 2024-05-22

## 2024-05-22 ENCOUNTER — TELEPHONE (OUTPATIENT)
Age: 74
End: 2024-05-22

## 2024-05-22 DIAGNOSIS — R73.03 PREDIABETES: ICD-10-CM

## 2024-05-22 DIAGNOSIS — R73.03 PREDIABETES: Primary | ICD-10-CM

## 2024-05-22 DIAGNOSIS — E78.5 HYPERLIPIDEMIA, UNSPECIFIED HYPERLIPIDEMIA TYPE: ICD-10-CM

## 2024-05-22 LAB
CHOLEST SERPL-MCNC: 201 MG/DL
EST. AVERAGE GLUCOSE BLD GHB EST-MCNC: 117 MG/DL
HBA1C MFR BLD: 5.7 % (ref 4–5.6)
HDLC SERPL-MCNC: 57 MG/DL
HDLC SERPL: 3.5 (ref 0–5)
LDLC SERPL CALC-MCNC: 111.4 MG/DL (ref 0–100)
TRIGL SERPL-MCNC: 163 MG/DL
VLDLC SERPL CALC-MCNC: 32.6 MG/DL

## 2024-08-07 ENCOUNTER — TELEPHONE (OUTPATIENT)
Age: 74
End: 2024-08-07

## 2024-08-07 DIAGNOSIS — E78.5 HYPERLIPIDEMIA, UNSPECIFIED HYPERLIPIDEMIA TYPE: ICD-10-CM

## 2024-08-07 NOTE — TELEPHONE ENCOUNTER
Patient called would like to know is lab results that was done in May this year.     Requesting a call back    Best call back #[phone]

## 2024-08-12 RX ORDER — SIMVASTATIN 40 MG
40 TABLET ORAL NIGHTLY
Qty: 90 TABLET | Refills: 0 | Status: SHIPPED | OUTPATIENT
Start: 2024-08-12

## 2024-08-12 NOTE — TELEPHONE ENCOUNTER
Hemoglobin A1C (average blood sugar level for past 3 months) is in the pre diabetes range, which means your average sugar or glucose level is higher than normal. I would encourage healthy food choices and regular exercise with the goal of maintaining a healthy weight before starting medications for this.  Cholesterol is improved but still high; will continue same statin dose for now and pt to follow up with PCP .

## 2024-08-12 NOTE — TELEPHONE ENCOUNTER
PCP: Alie Leach APRN - CNP    Last appt: 1/18/2024     No future appointments.    Requested Prescriptions     Pending Prescriptions Disp Refills    simvastatin (ZOCOR) 40 MG tablet 90 tablet 0     Sig: Take 1 tablet by mouth nightly       Prior labs and Blood pressures:  BP Readings from Last 3 Encounters:   01/18/24 133/81   11/15/22 122/78   11/02/21 130/65     Lab Results   Component Value Date/Time     01/18/2024 02:55 PM    K 4.6 01/18/2024 02:55 PM     01/18/2024 02:55 PM    CO2 30 01/18/2024 02:55 PM    BUN 12 01/18/2024 02:55 PM    GFRAA >60 11/02/2021 12:05 PM     No results found for: \"HBA1C\", \"[id number]\"  Lab Results   Component Value Date/Time    CHOL 201 05/22/2024 08:15 AM    HDL 57 05/22/2024 08:15 AM    .4 05/22/2024 08:15 AM    .4 01/18/2024 02:55 PM    VLDL 32.6 05/22/2024 08:15 AM     No results found for: \"VITD3\"    Lab Results   Component Value Date/Time    TSH 1.49 11/15/2022 10:27 AM

## 2024-08-20 NOTE — TELEPHONE ENCOUNTER
,Call and spoke to patient, two ID confirmed.   Patient informed writer that lab results were updated and med. Was reorder.

## 2024-10-28 ENCOUNTER — TELEPHONE (OUTPATIENT)
Age: 74
End: 2024-10-28

## 2024-10-28 NOTE — TELEPHONE ENCOUNTER
Patient called to get a refill of his Simvastatin that will run out in 5 days. He has an appt 1/2/25.

## 2024-10-30 DIAGNOSIS — E78.5 HYPERLIPIDEMIA, UNSPECIFIED HYPERLIPIDEMIA TYPE: ICD-10-CM

## 2024-10-30 RX ORDER — SIMVASTATIN 40 MG
40 TABLET ORAL NIGHTLY
Qty: 90 TABLET | Refills: 0 | Status: SHIPPED | OUTPATIENT
Start: 2024-10-30

## 2024-10-30 NOTE — TELEPHONE ENCOUNTER
PCP: Alie Leach APRN - CNP    Last appt: 1/18/2024   Future Appointments   Date Time Provider Department Center   1/2/2025  8:00 AM Alie Leach APRN - CNP [facility]       Requested Prescriptions     Pending Prescriptions Disp Refills    simvastatin (ZOCOR) 40 MG tablet 90 tablet 0     Sig: Take 1 tablet by mouth nightly         Prior labs and Blood pressures:  BP Readings from Last 3 Encounters:   01/18/24 133/81   11/15/22 122/78   11/02/21 130/65     Lab Results   Component Value Date/Time     01/18/2024 02:55 PM    K 4.6 01/18/2024 02:55 PM     01/18/2024 02:55 PM    CO2 30 01/18/2024 02:55 PM    BUN 12 01/18/2024 02:55 PM    GFRAA >60 11/02/2021 12:05 PM     No results found for: \"HBA1C\", \"[id number]\"  Lab Results   Component Value Date/Time    CHOL 201 05/22/2024 08:15 AM    HDL 57 05/22/2024 08:15 AM    .4 05/22/2024 08:15 AM    .4 01/18/2024 02:55 PM    VLDL 32.6 05/22/2024 08:15 AM     No results found for: \"VITD3\"    Lab Results   Component Value Date/Time    TSH 1.49 11/15/2022 10:27 AM

## 2025-02-03 DIAGNOSIS — E78.5 HYPERLIPIDEMIA, UNSPECIFIED HYPERLIPIDEMIA TYPE: ICD-10-CM

## 2025-02-03 NOTE — TELEPHONE ENCOUNTER
Pt requesting to refill his med ( simvastin). Pharmacy on file verified.Pt can be reached at [phone]. Thank you

## 2025-02-14 RX ORDER — SIMVASTATIN 40 MG
40 TABLET ORAL NIGHTLY
Qty: 90 TABLET | Refills: 0 | Status: SHIPPED | OUTPATIENT
Start: 2025-02-14

## 2025-02-14 NOTE — TELEPHONE ENCOUNTER
PCP: No primary care provider on file.    Last appt: 1/18/2024     Future Appointments   Date Time Provider Department Center   3/19/2025 10:00 AM Kyaw Killian APRN - CNP [facility]       Requested Prescriptions     Pending Prescriptions Disp Refills    simvastatin (ZOCOR) 40 MG tablet 30 tablet 0     Sig: Take 1 tablet by mouth nightly       Prior labs and Blood pressures:  BP Readings from Last 3 Encounters:   01/18/24 133/81   11/15/22 122/78   11/02/21 130/65     Lab Results   Component Value Date/Time     01/18/2024 02:55 PM    K 4.6 01/18/2024 02:55 PM     01/18/2024 02:55 PM    CO2 30 01/18/2024 02:55 PM    BUN 12 01/18/2024 02:55 PM    GFRAA >60 11/02/2021 12:05 PM     No results found for: \"HBA1C\", \"[id number]\"  Lab Results   Component Value Date/Time    CHOL 201 05/22/2024 08:15 AM    HDL 57 05/22/2024 08:15 AM    .4 05/22/2024 08:15 AM    .4 01/18/2024 02:55 PM    VLDL 32.6 05/22/2024 08:15 AM     No results found for: \"VITD3\"    Lab Results   Component Value Date/Time    TSH 1.49 11/15/2022 10:27 AM

## 2025-03-19 ENCOUNTER — OFFICE VISIT (OUTPATIENT)
Age: 75
End: 2025-03-19
Payer: MEDICARE

## 2025-03-19 VITALS
DIASTOLIC BLOOD PRESSURE: 68 MMHG | HEIGHT: 68 IN | HEART RATE: 63 BPM | SYSTOLIC BLOOD PRESSURE: 134 MMHG | RESPIRATION RATE: 14 BRPM | OXYGEN SATURATION: 97 % | TEMPERATURE: 97.5 F | WEIGHT: 190.2 LBS | BODY MASS INDEX: 28.82 KG/M2

## 2025-03-19 DIAGNOSIS — Z00.00 MEDICARE ANNUAL WELLNESS VISIT, SUBSEQUENT: Primary | ICD-10-CM

## 2025-03-19 DIAGNOSIS — Z12.5 ENCOUNTER FOR SCREENING FOR MALIGNANT NEOPLASM OF PROSTATE: ICD-10-CM

## 2025-03-19 DIAGNOSIS — R73.03 PREDIABETES: ICD-10-CM

## 2025-03-19 DIAGNOSIS — E78.5 HYPERLIPIDEMIA WITH TARGET LDL LESS THAN 70: ICD-10-CM

## 2025-03-19 PROCEDURE — G2211 COMPLEX E/M VISIT ADD ON: HCPCS | Performed by: NURSE PRACTITIONER

## 2025-03-19 PROCEDURE — 1159F MED LIST DOCD IN RCRD: CPT | Performed by: NURSE PRACTITIONER

## 2025-03-19 PROCEDURE — 99213 OFFICE O/P EST LOW 20 MIN: CPT | Performed by: NURSE PRACTITIONER

## 2025-03-19 PROCEDURE — G8417 CALC BMI ABV UP PARAM F/U: HCPCS | Performed by: NURSE PRACTITIONER

## 2025-03-19 PROCEDURE — G8427 DOCREV CUR MEDS BY ELIG CLIN: HCPCS | Performed by: NURSE PRACTITIONER

## 2025-03-19 PROCEDURE — 1160F RVW MEDS BY RX/DR IN RCRD: CPT | Performed by: NURSE PRACTITIONER

## 2025-03-19 PROCEDURE — 1126F AMNT PAIN NOTED NONE PRSNT: CPT | Performed by: NURSE PRACTITIONER

## 2025-03-19 PROCEDURE — 1036F TOBACCO NON-USER: CPT | Performed by: NURSE PRACTITIONER

## 2025-03-19 PROCEDURE — G0439 PPPS, SUBSEQ VISIT: HCPCS | Performed by: NURSE PRACTITIONER

## 2025-03-19 PROCEDURE — 3017F COLORECTAL CA SCREEN DOC REV: CPT | Performed by: NURSE PRACTITIONER

## 2025-03-19 PROCEDURE — 1123F ACP DISCUSS/DSCN MKR DOCD: CPT | Performed by: NURSE PRACTITIONER

## 2025-03-19 RX ORDER — SIMVASTATIN 40 MG
40 TABLET ORAL NIGHTLY
Qty: 90 TABLET | Refills: 3 | Status: SHIPPED | OUTPATIENT
Start: 2025-03-19

## 2025-03-19 RX ORDER — VITAMIN B COMPLEX
1 CAPSULE ORAL DAILY
COMMUNITY

## 2025-03-19 RX ORDER — VIT C/B6/B5/MAGNESIUM/HERB 173 50-5-6-5MG
CAPSULE ORAL DAILY
COMMUNITY

## 2025-03-19 SDOH — ECONOMIC STABILITY: FOOD INSECURITY: WITHIN THE PAST 12 MONTHS, THE FOOD YOU BOUGHT JUST DIDN'T LAST AND YOU DIDN'T HAVE MONEY TO GET MORE.: NEVER TRUE

## 2025-03-19 SDOH — ECONOMIC STABILITY: FOOD INSECURITY: WITHIN THE PAST 12 MONTHS, YOU WORRIED THAT YOUR FOOD WOULD RUN OUT BEFORE YOU GOT MONEY TO BUY MORE.: NEVER TRUE

## 2025-03-19 ASSESSMENT — PATIENT HEALTH QUESTIONNAIRE - PHQ9
SUM OF ALL RESPONSES TO PHQ QUESTIONS 1-9: 0
SUM OF ALL RESPONSES TO PHQ QUESTIONS 1-9: 0
1. LITTLE INTEREST OR PLEASURE IN DOING THINGS: NOT AT ALL
SUM OF ALL RESPONSES TO PHQ QUESTIONS 1-9: 0
SUM OF ALL RESPONSES TO PHQ QUESTIONS 1-9: 0
2. FEELING DOWN, DEPRESSED OR HOPELESS: NOT AT ALL

## 2025-03-19 ASSESSMENT — LIFESTYLE VARIABLES
HOW MANY STANDARD DRINKS CONTAINING ALCOHOL DO YOU HAVE ON A TYPICAL DAY: PATIENT DOES NOT DRINK
HOW OFTEN DO YOU HAVE A DRINK CONTAINING ALCOHOL: NEVER

## 2025-03-19 NOTE — PATIENT INSTRUCTIONS
several relapses before they are able to quit for good.  Follow-up care is a key part of your treatment and safety. Be sure to make and go to all appointments, and call your doctor if you are having problems. It's also a good idea to know your test results and keep a list of the medicines you take.  When should you call for help?   Call 911  anytime you think you may need emergency care. For example, call if you or someone else:    Has overdosed or has withdrawal signs. Be sure to tell the emergency workers that you are or someone else is using or trying to quit using drugs. Overdose or withdrawal signs may include:  Losing consciousness.  Seizure.  Seeing or hearing things that aren't there (hallucinations).     Is thinking or talking about suicide or harming others.   Where to get help 24 hours a day, 7 days a week   If you or someone you know talks about suicide, self-harm, a mental health crisis, a substance use crisis, or any other kind of emotional distress, get help right away. You can:    Call the Suicide and Crisis Lifeline at 663.     Call 5-503-446-YXZT (1-180.130.7834).     Text HOME to 253693 to access the Crisis Text Line.   Consider saving these numbers in your phone.  Go to [employer] for more information or to chat online.  Call your doctor now or seek immediate medical care if:    You are having withdrawal symptoms. These may include nausea or vomiting, sweating, shakiness, and anxiety.   Watch closely for changes in your health, and be sure to contact your doctor if:    You have a relapse.     You need more help or support to stop.   Where can you learn more?  Go to https://www.healthInSample.net/patientEd and enter H573 to learn more about \"Substance Use Disorder: Care Instructions.\"  Current as of: August 20, 2024  Content Version: 14.4  © 0272-3536 [employer].   Care instructions adapted under license by [employer]. If you have questions about a medical condition or this